# Patient Record
Sex: FEMALE | Race: WHITE | NOT HISPANIC OR LATINO | Employment: FULL TIME | URBAN - METROPOLITAN AREA
[De-identification: names, ages, dates, MRNs, and addresses within clinical notes are randomized per-mention and may not be internally consistent; named-entity substitution may affect disease eponyms.]

---

## 2017-05-30 ENCOUNTER — HOSPITAL ENCOUNTER (EMERGENCY)
Facility: MEDICAL CENTER | Age: 42
End: 2017-05-30
Attending: EMERGENCY MEDICINE

## 2017-05-30 VITALS
DIASTOLIC BLOOD PRESSURE: 102 MMHG | SYSTOLIC BLOOD PRESSURE: 172 MMHG | TEMPERATURE: 98.6 F | WEIGHT: 132.28 LBS | RESPIRATION RATE: 18 BRPM | HEIGHT: 65 IN | OXYGEN SATURATION: 96 % | HEART RATE: 71 BPM | BODY MASS INDEX: 22.04 KG/M2

## 2017-05-30 DIAGNOSIS — F41.9 ANXIETY: ICD-10-CM

## 2017-05-30 DIAGNOSIS — I10 ESSENTIAL HYPERTENSION: ICD-10-CM

## 2017-05-30 LAB
ALBUMIN SERPL BCP-MCNC: 4.3 G/DL (ref 3.2–4.9)
ALBUMIN/GLOB SERPL: 1.4 G/DL
ALP SERPL-CCNC: 45 U/L (ref 30–99)
ALT SERPL-CCNC: 27 U/L (ref 2–50)
ANION GAP SERPL CALC-SCNC: 11 MMOL/L (ref 0–11.9)
AST SERPL-CCNC: 22 U/L (ref 12–45)
BASOPHILS # BLD AUTO: 0.9 % (ref 0–1.8)
BASOPHILS # BLD: 0.04 K/UL (ref 0–0.12)
BILIRUB SERPL-MCNC: 0.5 MG/DL (ref 0.1–1.5)
BUN SERPL-MCNC: 10 MG/DL (ref 8–22)
CALCIUM SERPL-MCNC: 9 MG/DL (ref 8.4–10.2)
CHLORIDE SERPL-SCNC: 108 MMOL/L (ref 96–112)
CO2 SERPL-SCNC: 19 MMOL/L (ref 20–33)
CREAT SERPL-MCNC: 0.75 MG/DL (ref 0.5–1.4)
EKG IMPRESSION: NORMAL
EOSINOPHIL # BLD AUTO: 0.03 K/UL (ref 0–0.51)
EOSINOPHIL NFR BLD: 0.7 % (ref 0–6.9)
ERYTHROCYTE [DISTWIDTH] IN BLOOD BY AUTOMATED COUNT: 43.4 FL (ref 35.9–50)
GFR SERPL CREATININE-BSD FRML MDRD: >60 ML/MIN/1.73 M 2
GLOBULIN SER CALC-MCNC: 3.1 G/DL (ref 1.9–3.5)
GLUCOSE SERPL-MCNC: 90 MG/DL (ref 65–99)
HCT VFR BLD AUTO: 41.1 % (ref 37–47)
HGB BLD-MCNC: 14.6 G/DL (ref 12–16)
IMM GRANULOCYTES # BLD AUTO: 0.01 K/UL (ref 0–0.11)
IMM GRANULOCYTES NFR BLD AUTO: 0.2 % (ref 0–0.9)
LYMPHOCYTES # BLD AUTO: 1.3 K/UL (ref 1–4.8)
LYMPHOCYTES NFR BLD: 28.6 % (ref 22–41)
MCH RBC QN AUTO: 34.4 PG (ref 27–33)
MCHC RBC AUTO-ENTMCNC: 35.5 G/DL (ref 33.6–35)
MCV RBC AUTO: 96.7 FL (ref 81.4–97.8)
MONOCYTES # BLD AUTO: 0.29 K/UL (ref 0–0.85)
MONOCYTES NFR BLD AUTO: 6.4 % (ref 0–13.4)
NEUTROPHILS # BLD AUTO: 2.88 K/UL (ref 2–7.15)
NEUTROPHILS NFR BLD: 63.2 % (ref 44–72)
NRBC # BLD AUTO: 0 K/UL
NRBC BLD AUTO-RTO: 0 /100 WBC
PLATELET # BLD AUTO: 229 K/UL (ref 164–446)
PMV BLD AUTO: 9.1 FL (ref 9–12.9)
POTASSIUM SERPL-SCNC: 3.7 MMOL/L (ref 3.6–5.5)
PROT SERPL-MCNC: 7.4 G/DL (ref 6–8.2)
RBC # BLD AUTO: 4.25 M/UL (ref 4.2–5.4)
SODIUM SERPL-SCNC: 138 MMOL/L (ref 135–145)
TROPONIN I SERPL-MCNC: <0.02 NG/ML (ref 0–0.04)
WBC # BLD AUTO: 4.6 K/UL (ref 4.8–10.8)

## 2017-05-30 PROCEDURE — 93005 ELECTROCARDIOGRAM TRACING: CPT | Performed by: EMERGENCY MEDICINE

## 2017-05-30 PROCEDURE — 99284 EMERGENCY DEPT VISIT MOD MDM: CPT

## 2017-05-30 PROCEDURE — 80053 COMPREHEN METABOLIC PANEL: CPT

## 2017-05-30 PROCEDURE — 36415 COLL VENOUS BLD VENIPUNCTURE: CPT

## 2017-05-30 PROCEDURE — A9270 NON-COVERED ITEM OR SERVICE: HCPCS | Performed by: EMERGENCY MEDICINE

## 2017-05-30 PROCEDURE — 84484 ASSAY OF TROPONIN QUANT: CPT

## 2017-05-30 PROCEDURE — 700102 HCHG RX REV CODE 250 W/ 637 OVERRIDE(OP): Performed by: EMERGENCY MEDICINE

## 2017-05-30 PROCEDURE — 85025 COMPLETE CBC W/AUTO DIFF WBC: CPT

## 2017-05-30 RX ORDER — ALPRAZOLAM 0.5 MG/1
0.5 TABLET ORAL 2 TIMES DAILY
Qty: 24 TAB | Refills: 0 | Status: SHIPPED | OUTPATIENT
Start: 2017-05-30

## 2017-05-30 RX ORDER — LISINOPRIL 5 MG/1
5 TABLET ORAL ONCE
Status: COMPLETED | OUTPATIENT
Start: 2017-05-30 | End: 2017-05-30

## 2017-05-30 RX ORDER — LISINOPRIL 5 MG/1
5 TABLET ORAL DAILY
Qty: 30 TAB | Refills: 0 | Status: SHIPPED | OUTPATIENT
Start: 2017-05-30

## 2017-05-30 RX ORDER — ACETAMINOPHEN 500 MG
1000 TABLET ORAL EVERY 6 HOURS PRN
Status: SHIPPED | COMMUNITY
End: 2021-07-14

## 2017-05-30 RX ORDER — LORAZEPAM 1 MG/1
1 TABLET ORAL ONCE
Status: COMPLETED | OUTPATIENT
Start: 2017-05-30 | End: 2017-05-30

## 2017-05-30 RX ADMIN — LISINOPRIL 5 MG: 5 TABLET ORAL at 10:20

## 2017-05-30 RX ADMIN — LORAZEPAM 1 MG: 1 TABLET ORAL at 10:21

## 2017-05-30 ASSESSMENT — ENCOUNTER SYMPTOMS
HEADACHES: 0
NAUSEA: 0
SHORTNESS OF BREATH: 0
BACK PAIN: 0
NERVOUS/ANXIOUS: 1
FEVER: 0
ABDOMINAL PAIN: 0
DIZZINESS: 0
WEAKNESS: 0

## 2017-05-30 ASSESSMENT — PAIN SCALES - GENERAL: PAINLEVEL_OUTOF10: 0

## 2017-05-30 NOTE — ED NOTES
The Medication Reconciliation process has been completed by interviewing the patient    Allergies have been reviewed  Antibiotic use in 30 days - NONE    Home Pharmacy:  [facility] [address]

## 2017-05-30 NOTE — ED NOTES
"Pt amb to triage c/o high BP this morning which was checked at [facility] this morning 186/120. Pt also c/o slight HA. Pt took lisinopril in the past. Last taken was a year ago. Pt stopped taking meds due to financial difficulties.   /122 mmHg  Pulse 89  Temp(Src) 37 °C (98.6 °F)  Resp 20  Ht 1.651 m (5' 5\")  Wt 60 kg (132 lb 4.4 oz)  BMI 22.01 kg/m2  SpO2 97%    "

## 2017-05-30 NOTE — ED AVS SNAPSHOT
Home Care Instructions                                                                                                                Mary Ann Gilbert   MRN: [id number]    Department:  [facility], Emergency Dept   Date of Visit:  5/30/2017            [facility], Emergency Dept    [address]    [place] [address]    Phone:  [phone]      You were seen by     Bridgette Torres D.O.      Your Diagnosis Was     Essential hypertension     I10       These are the medications you received during your hospitalization from 05/30/2017 0847 to 05/30/2017 1045     Date/Time Order Dose Route Action    05/30/2017 1021 lorazepam (ATIVAN) tablet 1 mg 1 mg Oral Given    05/30/2017 1020 lisinopril (PRINIVIL) tablet 5 mg 5 mg Oral Given      Follow-up Information     1. Schedule an appointment as soon as possible for a visit with [facility].    Contact information    [address] [address]  [phone]        2. Schedule an appointment as soon as possible for a visit with [facility].    Specialty:  Family Medicine    Contact information    [address]  [address]  [place] [address]  [phone]          3. Schedule an appointment as soon as possible for a visit with Silvia Pate D.O..    Specialty:  Family Medicine    Contact information    [address]  [address]  [facility] [address]  [phone]          4. Schedule an appointment as soon as possible for a visit with [facility].    Contact information    [address]  [place] [address]  [phone]          5. Follow up with [facility], Emergency Dept.    Specialty:  Emergency Medicine    Why:  If symptoms worsen    Contact information    [address]  [facility] [address] [phone]      Medication Information     Review all of your home medications and newly ordered medications with your primary doctor and/or pharmacist as soon as possible.  Follow medication instructions as directed by your doctor and/or pharmacist.     Please keep your complete medication list with you and share with your physician. Update the information when medications are discontinued, doses are changed, or new medications (including over-the-counter products) are added; and carry medication information at all times in the event of emergency situations.               Medication List      START taking these medications        Instructions    Morning Afternoon Evening Bedtime    alprazolam 0.5 MG Tabs   Commonly known as:  XANAX        Take 1 Tab by mouth 2 Times a Day.   Dose:  0.5 mg                        lisinopril 5 MG Tabs   Last time this was given:  5 mg on 5/30/2017 10:20 AM   Commonly known as:  PRINIVIL        Take 1 Tab by mouth every day.   Dose:  5 mg                          ASK your doctor about these medications        Instructions    Morning Afternoon Evening Bedtime    acetaminophen 500 MG Tabs   Commonly known as:  TYLENOL        Take 1,000 mg by mouth every 6 hours as needed.   Dose:  1000 mg                        MELATONIN PO        Take 1 Dose by mouth at bedtime as needed.   Dose:  1 Dose                        VALERIAN PO        Take 1 Dose by mouth every day.   Dose:  1 Dose                             Where to Get Your Medications      You can get these medications from any pharmacy     Bring a paper prescription for each of these medications    - alprazolam 0.5 MG Tabs  - lisinopril 5 MG Tabs            Procedures and tests performed during your visit     CBC WITH DIFFERENTIAL    CMP    EKG (ER)    ESTIMATED GFR    TROPONIN        Discharge Instructions       Arterial Hypertension  Arterial hypertension (high blood pressure) is a condition of elevated pressure in your blood vessels. Hypertension over a long period of time is a risk factor for strokes, heart attacks, and heart failure. It is also the leading cause of kidney (renal) failure.   CAUSES  "  · In Adults -- Over 90% of all hypertension has no known cause. This is called essential or primary hypertension. In the other 10% of people with hypertension, the increase in blood pressure is caused by another disorder. This is called secondary hypertension. Important causes of secondary hypertension are:  · Heavy alcohol use.  · Obstructive sleep apnea.  · Hyperaldosterosim (Conn's syndrome).  · Steroid use.  · Chronic kidney failure.  · Hyperparathyroidism.  · Medications.  · Renal artery stenosis.  · Pheochromocytoma.  · Cushing's disease.  · Coarctation of the aorta.  · Scleroderma renal crisis.  · Licorice (in excessive amounts).  · Drugs (cocaine, methamphetamine).  Your caregiver can explain any items above that apply to you.  · In Children -- Secondary hypertension is more common and should always be considered.  · Pregnancy -- Few women of childbearing age have high blood pressure. However, up to 10% of them develop hypertension of pregnancy. Generally, this will not harm the woman. It may be a sign of 3 complications of pregnancy: preeclampsia, HELLP syndrome, and eclampsia. Follow up and control with medication is necessary.  SYMPTOMS   · This condition normally does not produce any noticeable symptoms. It is usually found during a routine exam.  · Malignant hypertension is a late problem of high blood pressure. It may have the following symptoms:  · Headaches.  · Blurred vision.  · End-organ damage (this means your kidneys, heart, lungs, and other organs are being damaged).  · Stressful situations can increase the blood pressure. If a person with normal blood pressure has their blood pressure go up while being seen by their caregiver, this is often termed \"white coat hypertension.\" Its importance is not known. It may be related with eventually developing hypertension or complications of hypertension.  · Hypertension is often confused with mental tension, stress, and anxiety.  DIAGNOSIS   The " "diagnosis is made by 3 separate blood pressure measurements. They are taken at least 1 week apart from each other. If there is organ damage from hypertension, the diagnosis may be made without repeat measurements.  Hypertension is usually identified by having blood pressure readings:  · Above 140/90 mmHg measured in both arms, at 3 separate times, over a couple weeks.  · Over 130/80 mmHg should be considered a risk factor and may require treatment in patients with diabetes.  Blood pressure readings over 120/80 mmHg are called \"pre-hypertension\" even in non-diabetic patients.  To get a true blood pressure measurement, use the following guidelines. Be aware of the factors that can alter blood pressure readings.  · Take measurements at least 1 hour after caffeine.  · Take measurements 30 minutes after smoking and without any stress. This is another reason to quit smoking  it raises your blood pressure.  · Use a proper cuff size. Ask your caregiver if you are not sure about your cuff size.  · Most home blood pressure cuffs are automatic. They will measure systolic and diastolic pressures. The systolic pressure is the pressure reading at the start of sounds. Diastolic pressure is the pressure at which the sounds disappear. If you are elderly, measure pressures in multiple postures. Try sitting, lying or standing.  · Sit at rest for a minimum of 5 minutes before taking measurements.  · You should not be on any medications like decongestants. These are found in many cold medications.  · Record your blood pressure readings and review them with your caregiver.  If you have hypertension:  · Your caregiver may do tests to be sure you do not have secondary hypertension (see \"causes\" above).  · Your caregiver may also look for signs of metabolic syndrome. This is also called Syndrome X or Insulin Resistance Syndrome. You may have this syndrome if you have type 2 diabetes, abdominal obesity, and abnormal blood lipids in addition " to hypertension.  · Your caregiver will take your medical and family history and perform a physical exam.  · Diagnostic tests may include blood tests (for glucose, cholesterol, potassium, and kidney function), a urinalysis, or an EKG. Other tests may also be necessary depending on your condition.  PREVENTION   There are important lifestyle issues that you can adopt to reduce your chance of developing hypertension:  · Maintain a normal weight.  · Limit the amount of salt (sodium) in your diet.  · Exercise often.  · Limit alcohol intake.  · Get enough potassium in your diet. Discuss specific advice with your caregiver.  · Follow a DASH diet (dietary approaches to stop hypertension). This diet is rich in fruits, vegetables, and low-fat dairy products, and avoids certain fats.  PROGNOSIS   Essential hypertension cannot be cured. Lifestyle changes and medical treatment can lower blood pressure and reduce complications. The prognosis of secondary hypertension depends on the underlying cause. Many people whose hypertension is controlled with medicine or lifestyle changes can live a normal, healthy life.   RISKS AND COMPLICATIONS   While high blood pressure alone is not an illness, it often requires treatment due to its short- and long-term effects on many organs. Hypertension increases your risk for:  · CVAs or strokes (cerebrovascular accident).  · Heart failure due to chronically high blood pressure (hypertensive cardiomyopathy).  · Heart attack (myocardial infarction).  · Damage to the retina (hypertensive retinopathy).  · Kidney failure (hypertensive nephropathy).  Your caregiver can explain list items above that apply to you. Treatment of hypertension can significantly reduce the risk of complications.  TREATMENT   · For overweight patients, weight loss and regular exercise are recommended. Physical fitness lowers blood pressure.  · Mild hypertension is usually treated with diet and exercise. A diet rich in fruits and  "vegetables, fat-free dairy products, and foods low in fat and salt (sodium) can help lower blood pressure. Decreasing salt intake decreases blood pressure in a 1/3 of people.  · Stop smoking if you are a smoker.  The steps above are highly effective in reducing blood pressure. While these actions are easy to suggest, they are difficult to achieve. Most patients with moderate or severe hypertension end up requiring medications to bring their blood pressure down to a normal level. There are several classes of medications for treatment. Blood pressure pills (antihypertensives) will lower blood pressure by their different actions. Lowering the blood pressure by 10 mmHg may decrease the risk of complications by as much as 25%.  The goal of treatment is effective blood pressure control. This will reduce your risk for complications. Your caregiver will help you determine the best treatment for you according to your lifestyle. What is excellent treatment for one person, may not be for you.  HOME CARE INSTRUCTIONS   · Do not smoke.  · Follow the lifestyle changes outlined in the \"Prevention\" section.  · If you are on medications, follow the directions carefully. Blood pressure medications must be taken as prescribed. Skipping doses reduces their benefit. It also puts you at risk for problems.  · Follow up with your caregiver, as directed.  · If you are asked to monitor your blood pressure at home, follow the guidelines in the \"Diagnosis\" section above.  SEEK MEDICAL CARE IF:   · You think you are having medication side effects.  · You have recurrent headaches or lightheadedness.  · You have swelling in your ankles.  · You have trouble with your vision.  SEEK IMMEDIATE MEDICAL CARE IF:   · You have sudden onset of chest pain or pressure, difficulty breathing, or other symptoms of a heart attack.  · You have a severe headache.  · You have symptoms of a stroke (such as sudden weakness, difficulty speaking, difficulty " walking).  MAKE SURE YOU:   · Understand these instructions.  · Will watch your condition.  · Will get help right away if you are not doing well or get worse.  Document Released: 12/18/2006 Document Revised: 03/11/2013 Document Reviewed: 07/17/2008  ExitCare® Patient Information ©2014 [employer].      Generalized Anxiety Disorder  Generalized anxiety disorder (DIONY) is a mental disorder. It interferes with life functions, including relationships, work, and school.  DIONY is different from normal anxiety, which everyone experiences at some point in their lives in response to specific life events and activities. Normal anxiety actually helps us prepare for and get through these life events and activities. Normal anxiety goes away after the event or activity is over.   DIONY causes anxiety that is not necessarily related to specific events or activities. It also causes excess anxiety in proportion to specific events or activities. The anxiety associated with DIONY is also difficult to control. DIONY can vary from mild to severe. People with severe DIONY can have intense waves of anxiety with physical symptoms (panic attacks).   SYMPTOMS  The anxiety and worry associated with DIONY are difficult to control. This anxiety and worry are related to many life events and activities and also occur more days than not for 6 months or longer. People with DIONY also have three or more of the following symptoms (one or more in children):  · Restlessness.    · Fatigue.  · Difficulty concentrating.    · Irritability.  · Muscle tension.  · Difficulty sleeping or unsatisfying sleep.  DIAGNOSIS  DIONY is diagnosed through an assessment by your health care provider. Your health care provider will ask you questions about your mood, physical symptoms, and events in your life. Your health care provider may ask you about your medical history and use of alcohol or drugs, including prescription medicines. Your health care provider may also do a physical  exam and blood tests. Certain medical conditions and the use of certain substances can cause symptoms similar to those associated with DIONY. Your health care provider may refer you to a mental health specialist for further evaluation.  TREATMENT  The following therapies are usually used to treat DIONY:   · Medication. Antidepressant medication usually is prescribed for long-term daily control. Antianxiety medicines may be added in severe cases, especially when panic attacks occur.    · Talk therapy (psychotherapy). Certain types of talk therapy can be helpful in treating DIONY by providing support, education, and guidance. A form of talk therapy called cognitive behavioral therapy can teach you healthy ways to think about and react to daily life events and activities.  · Stress management techniques. These include yoga, meditation, and exercise and can be very helpful when they are practiced regularly.  A mental health specialist can help determine which treatment is best for you. Some people see improvement with one therapy. However, other people require a combination of therapies.     This information is not intended to replace advice given to you by your health care provider. Make sure you discuss any questions you have with your health care provider.     Document Released: 04/14/2014 Document Revised: 01/08/2016 Document Reviewed: 04/14/2014  [employer] Interactive Patient Education ©2016 [employer]            Patient Information     Patient Information    Following emergency treatment: all patient requiring follow-up care must return either to a private physician or a clinic if your condition worsens before you are able to obtain further medical attention, please return to the emergency room.     Billing Information    At [facility], we work to make the billing process streamlined for our patients.  Our Representatives are here to answer any questions you may have regarding your hospital bill.  If you have  insurance coverage and have supplied your insurance information to us, we will submit a claim to your insurer on your behalf.  Should you have any questions regarding your bill, we can be reached online or by phone as follows:  Online: You are able pay your bills online or live chat with our representatives about any billing questions you may have. We are here to help Monday - Friday from 8:00am to 7:30pm and 9:00am - 12:00pm on Saturdays.  Please visit [email or web address]  for more information.   Phone:  [phone] or [phone]    Please note that your emergency physician, surgeon, pathologist, radiologist, anesthesiologist, and other specialists are not employed by [facility] and will therefore bill separately for their services.  Please contact them directly for any questions concerning their bills at the numbers below:     Emergency Physician Services:  [phone]  [facility]:  [phone]  [facility]:  [phone]  [facility]:  [phone]    1. Your final bill may vary from the amount quoted upon discharge if all procedures are not complete at that time, or if your doctor has additional procedures of which we are not aware. You will receive an additional bill if you return to the Emergency Department at [facility] for suture removal regardless of the facility of which the sutures were placed.     2. Please arrange for settlement of this account at the emergency registration.    3. All self-pay accounts are due in full at the time of treatment.  If you are unable to meet this obligation then payment is expected within 4-5 days.     4. If you have had radiology studies (CT, X-ray, Ultrasound, MRI), you have received a preliminary result during your emergency department visit. Please contact the radiology department [phone] to receive a copy of your final result. Please discuss the Final result with your  primary physician or with the follow up physician provided.     Crisis Hotline:  [place] Crisis Hotline:  2-825-EWXPLEJ or 1-254.640.3134  [place] Crisis Hotline:    1-897.613.8610 or 102-423-7301         ED Discharge Follow Up Questions    1. In order to provide you with very good care, we would like to follow up with a phone call in the next few days.  May we have your permission to contact you?     YES /  NO    2. What is the best phone number to call you? (       )_____-__________    3. What is the best time to call you?      Morning  /  Afternoon  /  Evening                   Patient Signature:  ____________________________________________________________    Date:  ____________________________________________________________

## 2017-05-30 NOTE — ED PROVIDER NOTES
"ED Provider Note    ED Provider Note    Scribed for Bridgette Torres D.O. by Bridgette Torres. 5/30/2017, 9:31 AM.    Primary care provider: Pcp Unknown  Means of arrival: POV  History obtained from: Patient  History limited by: None    CHIEF COMPLAINT  Chief Complaint   Patient presents with   • Hypertension       HPI  Mary Ann Gilbert is a 42 y.o. female who presents to the Emergency Department for the chief complaint of elevated blood pressure and anxiety. He also has intermittent lightheadedness and chest pain. Patient states she's had these symptoms on and off for \"a while\". She does have a history of hypertension and was previously treated with lisinopril, she thinks 5 or 10 mg dose. However, due to finances she has been off this medication for at least one year. She has a history of seizures. No history of diabetes or thyroid disorder. She is not having chest pain at this time. She also reports feeling strange. She points to being very anxious. She took her blood pressure at [facility] this morning noted it was high which prompted her to come to the emergency department. She is a prior surgical history. She does look cigarettes approximately 4 per day and she drinks alcohol daily, 2-3 drinks per night. She denies any drug use. She does have a family history of hypertension with her mother. Her father, who accompanies her here in the ED has had a stroke when he was the age of 54, he is now 74. Patient also reports that she has taken alprazolam in the past for anxiety which has been helpful.    REVIEW OF SYSTEMS  Review of Systems   Constitutional: Negative for fever.   Respiratory: Negative for shortness of breath.    Cardiovascular: Negative for chest pain.   Gastrointestinal: Negative for nausea and abdominal pain.   Genitourinary: Negative for dysuria.   Musculoskeletal: Negative for back pain.   Neurological: Negative for dizziness, weakness and headaches.   Psychiatric/Behavioral: The patient is " "nervous/anxious.        PAST MEDICAL HISTORY       SURGICAL HISTORY  patient denies any surgical history    SOCIAL HISTORY  Social History   Substance Use Topics   • Smoking status: Not on file   • Smokeless tobacco: Not on file      Comment: nonsmoker   • Alcohol Use: Not on file      History   Drug Use Not on file       FAMILY HISTORY  No family history on file.    CURRENT MEDICATIONS  Home Medications     Reviewed by Edy Mendoza (Pharmacy Tech) on 05/30/17 at 1026  Med List Status: Complete    Medication Last Dose Status    acetaminophen (TYLENOL) 500 MG Tab 5/29/2017 Active    MELATONIN PO 5/30/2017 Active    VALERIAN PO 5/29/2017 Active                ALLERGIES  Allergies   Allergen Reactions   • Pcn [Penicillins] Anaphylaxis     Rxn - as a baby and a strong familial allergy       PHYSICAL EXAM  VITAL SIGNS: /102 mmHg  Pulse 71  Temp(Src) 37 °C (98.6 °F)  Resp 18  Ht 1.651 m (5' 5\")  Wt 60 kg (132 lb 4.4 oz)  BMI 22.01 kg/m2  SpO2 96%  Vitals reviewed.  Constitutional: Patient is oriented to person, place, and time. Appears well-developed and well-nourished. No distress.  very anxious   Head: Normocephalic and atraumatic.   Ears: Normal external ears bilaterally.   Mouth/Throat: Oropharynx is clear and moist  Eyes: Conjunctivae are normal. Pupils are equal, round, and reactive to light.   Neck: Normal range of motion. Neck supple.  Cardiovascular: Normal rate, regular rhythm and normal heart sounds.   Pulmonary/Chest: Effort normal and breath sounds normal. No respiratory distress, no wheezes, rhonchi, or rales.   Abdominal: Soft. Bowel sounds are normal. There is no tenderness, rebound or guarding, or peritoneal signs. No CVA tenderness.  Musculoskeletal: No edema and no tenderness.   Lymphadenopathy: No cervical adenopathy.   Neurological: No focal deficits.   Skin: Skin is warm and dry. No erythema. No pallor.   Psychiatric: Patient has a normal mood and affect, given circumstances, " she is very anxious but forthcoming and pleasant     LABS  Results for orders placed or performed during the hospital encounter of 05/30/17   TROPONIN   Result Value Ref Range    Troponin I <0.02 0.00 - 0.04 ng/mL   CMP   Result Value Ref Range    Sodium 138 135 - 145 mmol/L    Potassium 3.7 3.6 - 5.5 mmol/L    Chloride 108 96 - 112 mmol/L    Co2 19 (L) 20 - 33 mmol/L    Anion Gap 11.0 0.0 - 11.9    Glucose 90 65 - 99 mg/dL    Bun 10 8 - 22 mg/dL    Creatinine 0.75 0.50 - 1.40 mg/dL    Calcium 9.0 8.4 - 10.2 mg/dL    AST(SGOT) 22 12 - 45 U/L    ALT(SGPT) 27 2 - 50 U/L    Alkaline Phosphatase 45 30 - 99 U/L    Total Bilirubin 0.5 0.1 - 1.5 mg/dL    Albumin 4.3 3.2 - 4.9 g/dL    Total Protein 7.4 6.0 - 8.2 g/dL    Globulin 3.1 1.9 - 3.5 g/dL    A-G Ratio 1.4 g/dL   CBC WITH DIFFERENTIAL   Result Value Ref Range    WBC 4.6 (L) 4.8 - 10.8 K/uL    RBC 4.25 4.20 - 5.40 M/uL    Hemoglobin 14.6 12.0 - 16.0 g/dL    Hematocrit 41.1 37.0 - 47.0 %    MCV 96.7 81.4 - 97.8 fL    MCH 34.4 (H) 27.0 - 33.0 pg    MCHC 35.5 (H) 33.6 - 35.0 g/dL    RDW 43.4 35.9 - 50.0 fL    Platelet Count 229 164 - 446 K/uL    MPV 9.1 9.0 - 12.9 fL    Neutrophils-Polys 63.20 44.00 - 72.00 %    Lymphocytes 28.60 22.00 - 41.00 %    Monocytes 6.40 0.00 - 13.40 %    Eosinophils 0.70 0.00 - 6.90 %    Basophils 0.90 0.00 - 1.80 %    Immature Granulocytes 0.20 0.00 - 0.90 %    Nucleated RBC 0.00 /100 WBC    Neutrophils (Absolute) 2.88 2.00 - 7.15 K/uL    Lymphs (Absolute) 1.30 1.00 - 4.80 K/uL    Monos (Absolute) 0.29 0.00 - 0.85 K/uL    Eos (Absolute) 0.03 0.00 - 0.51 K/uL    Baso (Absolute) 0.04 0.00 - 0.12 K/uL    Immature Granulocytes (abs) 0.01 0.00 - 0.11 K/uL    NRBC (Absolute) 0.00 K/uL   ESTIMATED GFR   Result Value Ref Range    GFR If African American >60 >60 mL/min/1.73 m 2    GFR If Non African American >60 >60 mL/min/1.73 m 2   EKG (ER)   Result Value Ref Range    Report       [facility] Emergency Dept.    Test Date:   2017  Pt Name:    MARTI FLANNERY                Department: [facility]  MRN:        [id number]                      Room:       -ROOM 1  Gender:     F                            Technician: GLORIA  :        1975                   Requested By:ER TRIAGE PROTOCOL  Order #:    [id number]                    Reading MD:    Measurements  Intervals                                Axis  Rate:       89                           P:          69  NC:         146                          QRS:        52  QRSD:       88                           T:          15  QT:         370  QTc:        451    Interpretive Statements  Sinus rhythm  Probable left atrial enlargement  No previous ECG available for comparison         All labs reviewed by me.    EKG Interpretation  Interpreted by me    Rhythm: normal sinus   Rate: 89  Axis: normal  Ectopy: none  Conduction: normal  ST Segments: no acute change  T Waves: no acute change  Q Waves: none    Clinical Impression: No acute ST segment elevation. There is no old EKG for comparison. Sinus rhythm rate of 89.     COURSE & MEDICAL DECISION MAKING  Pertinent Labs & Imaging studies reviewed. (See chart for details)    Obtained and reviewed past medical records. Patient's last ED visit with  where she was seen as an outpatient for viral syndrome.    9:31 AM - Patient seen and examined at bedside. Patient is well-appearing and nontoxic. She does not have any symptoms now to suggest end organ damage. No chest pain. No neurologic deficits. No headache. No visual changes. We will screen for end organ damage. She's had an EKG which is reassuring. If no abnormalities noted, I have advised, that we simply restart her hypertensive meds and I'm encouraged her to establish care with a primary care provider.     10:38 AM he is reevaluated. We discussed labs which were normal creatinine. Normal troponin. She is not expressing any pain at this time. She is feeling better. Still hypertensive. Review of her   shows that she has been taking alprazolam, 0.25 mg in the past and she reports improvement in her anxiety symptoms with this. We'll restart her on lisinopril 5 mg once a day. She is advised to establish care with a primary care provider. She is given multiple options. She is given strict return precautions.    She'll be discharged home in stable condition.    FINAL IMPRESSION  1. Essential hypertension    2. Anxiety

## 2017-05-30 NOTE — ED AVS SNAPSHOT
Lentigen Access Code: [email or web address]  Expires: 6/29/2017 10:45 AM    Your email address is not on file at [facility].  Email Addresses are required for you to sign up for Lentigen, please contact [phone] to verify your personal information and to provide your email address prior to attempting to register for Lentigen.    Mary Annmarcus Swannpernell Gilbert  [address] [place] [id number]    Lentigen  A secure, online tool to manage your health information     [facility]’s Lentigen® is a secure, online tool that connects you to your personalized health information from the privacy of your home -- day or night - making it very easy for you to manage your healthcare. Once the activation process is completed, you can even access your medical information using the Lentigen gladis, which is available for free in the Apple Gladis store or Google Play store.     To learn more about Lentigen, visit [email or web address]    There are two levels of access available (as shown below):   My Chart Features  [facility] Primary Care Doctor [facility]  Specialists [facility]  Urgent  Care [facility] Primary Care Doctor   Email your healthcare team securely and privately 24/7 X X X    Manage appointments: schedule your next appointment; view details of past/upcoming appointments X      Request prescription refills. X      View recent personal medical records, including lab and immunizations X X X X   View health record, including health history, allergies, medications X X X X   Read reports about your outpatient visits, procedures, consult and ER notes X X X X   See your discharge summary, which is a recap of your hospital and/or ER visit that includes your diagnosis, lab results, and care plan X X  X     How to register for Connect Technology Groupt:  Once your e-mail address has been verified, follow the following steps to sign up for Connect Technology Groupt.     1. Go to  [email or web address]  2. Click on the Sign Up Now box, which takes you to the New Member Sign Up page. You will  need to provide the following information:  a. Enter your Handprint Access Code exactly as it appears at the top of this page. (You will not need to use this code after you’ve completed the sign-up process. If you do not sign up before the expiration date, you must request a new code.)   b. Enter your date of birth.   c. Enter your home email address.   d. Click Submit, and follow the next screen’s instructions.  3. Create a Openbuildst ID. This will be your Handprint login ID and cannot be changed, so think of one that is secure and easy to remember.  4. Create a Handprint password. You can change your password at any time.  5. Enter your Password Reset Question and Answer. This can be used at a later time if you forget your password.   6. Enter your e-mail address. This allows you to receive e-mail notifications when new information is available in Handprint.  7. Click Sign Up. You can now view your health information.    For assistance activating your Handprint account, call [phone]

## 2017-05-30 NOTE — ED NOTES
"Verbalized that's she is under a lot of stress. Getting a divorce, moving. \"I feel really weird\". Some chest pain yesterday. EKG ordered.  "

## 2017-05-30 NOTE — DISCHARGE INSTRUCTIONS
Arterial Hypertension  Arterial hypertension (high blood pressure) is a condition of elevated pressure in your blood vessels. Hypertension over a long period of time is a risk factor for strokes, heart attacks, and heart failure. It is also the leading cause of kidney (renal) failure.   CAUSES   · In Adults -- Over 90% of all hypertension has no known cause. This is called essential or primary hypertension. In the other 10% of people with hypertension, the increase in blood pressure is caused by another disorder. This is called secondary hypertension. Important causes of secondary hypertension are:  · Heavy alcohol use.  · Obstructive sleep apnea.  · Hyperaldosterosim (Conn's syndrome).  · Steroid use.  · Chronic kidney failure.  · Hyperparathyroidism.  · Medications.  · Renal artery stenosis.  · Pheochromocytoma.  · Cushing's disease.  · Coarctation of the aorta.  · Scleroderma renal crisis.  · Licorice (in excessive amounts).  · Drugs (cocaine, methamphetamine).  Your caregiver can explain any items above that apply to you.  · In Children -- Secondary hypertension is more common and should always be considered.  · Pregnancy -- Few women of childbearing age have high blood pressure. However, up to 10% of them develop hypertension of pregnancy. Generally, this will not harm the woman. It may be a sign of 3 complications of pregnancy: preeclampsia, HELLP syndrome, and eclampsia. Follow up and control with medication is necessary.  SYMPTOMS   · This condition normally does not produce any noticeable symptoms. It is usually found during a routine exam.  · Malignant hypertension is a late problem of high blood pressure. It may have the following symptoms:  · Headaches.  · Blurred vision.  · End-organ damage (this means your kidneys, heart, lungs, and other organs are being damaged).  · Stressful situations can increase the blood pressure. If a person with normal blood pressure has their blood pressure go up while being  "seen by their caregiver, this is often termed \"white coat hypertension.\" Its importance is not known. It may be related with eventually developing hypertension or complications of hypertension.  · Hypertension is often confused with mental tension, stress, and anxiety.  DIAGNOSIS   The diagnosis is made by 3 separate blood pressure measurements. They are taken at least 1 week apart from each other. If there is organ damage from hypertension, the diagnosis may be made without repeat measurements.  Hypertension is usually identified by having blood pressure readings:  · Above 140/90 mmHg measured in both arms, at 3 separate times, over a couple weeks.  · Over 130/80 mmHg should be considered a risk factor and may require treatment in patients with diabetes.  Blood pressure readings over 120/80 mmHg are called \"pre-hypertension\" even in non-diabetic patients.  To get a true blood pressure measurement, use the following guidelines. Be aware of the factors that can alter blood pressure readings.  · Take measurements at least 1 hour after caffeine.  · Take measurements 30 minutes after smoking and without any stress. This is another reason to quit smoking  it raises your blood pressure.  · Use a proper cuff size. Ask your caregiver if you are not sure about your cuff size.  · Most home blood pressure cuffs are automatic. They will measure systolic and diastolic pressures. The systolic pressure is the pressure reading at the start of sounds. Diastolic pressure is the pressure at which the sounds disappear. If you are elderly, measure pressures in multiple postures. Try sitting, lying or standing.  · Sit at rest for a minimum of 5 minutes before taking measurements.  · You should not be on any medications like decongestants. These are found in many cold medications.  · Record your blood pressure readings and review them with your caregiver.  If you have hypertension:  · Your caregiver may do tests to be sure you do not have " "secondary hypertension (see \"causes\" above).  · Your caregiver may also look for signs of metabolic syndrome. This is also called Syndrome X or Insulin Resistance Syndrome. You may have this syndrome if you have type 2 diabetes, abdominal obesity, and abnormal blood lipids in addition to hypertension.  · Your caregiver will take your medical and family history and perform a physical exam.  · Diagnostic tests may include blood tests (for glucose, cholesterol, potassium, and kidney function), a urinalysis, or an EKG. Other tests may also be necessary depending on your condition.  PREVENTION   There are important lifestyle issues that you can adopt to reduce your chance of developing hypertension:  · Maintain a normal weight.  · Limit the amount of salt (sodium) in your diet.  · Exercise often.  · Limit alcohol intake.  · Get enough potassium in your diet. Discuss specific advice with your caregiver.  · Follow a DASH diet (dietary approaches to stop hypertension). This diet is rich in fruits, vegetables, and low-fat dairy products, and avoids certain fats.  PROGNOSIS   Essential hypertension cannot be cured. Lifestyle changes and medical treatment can lower blood pressure and reduce complications. The prognosis of secondary hypertension depends on the underlying cause. Many people whose hypertension is controlled with medicine or lifestyle changes can live a normal, healthy life.   RISKS AND COMPLICATIONS   While high blood pressure alone is not an illness, it often requires treatment due to its short- and long-term effects on many organs. Hypertension increases your risk for:  · CVAs or strokes (cerebrovascular accident).  · Heart failure due to chronically high blood pressure (hypertensive cardiomyopathy).  · Heart attack (myocardial infarction).  · Damage to the retina (hypertensive retinopathy).  · Kidney failure (hypertensive nephropathy).  Your caregiver can explain list items above that apply to you. Treatment " "of hypertension can significantly reduce the risk of complications.  TREATMENT   · For overweight patients, weight loss and regular exercise are recommended. Physical fitness lowers blood pressure.  · Mild hypertension is usually treated with diet and exercise. A diet rich in fruits and vegetables, fat-free dairy products, and foods low in fat and salt (sodium) can help lower blood pressure. Decreasing salt intake decreases blood pressure in a 1/3 of people.  · Stop smoking if you are a smoker.  The steps above are highly effective in reducing blood pressure. While these actions are easy to suggest, they are difficult to achieve. Most patients with moderate or severe hypertension end up requiring medications to bring their blood pressure down to a normal level. There are several classes of medications for treatment. Blood pressure pills (antihypertensives) will lower blood pressure by their different actions. Lowering the blood pressure by 10 mmHg may decrease the risk of complications by as much as 25%.  The goal of treatment is effective blood pressure control. This will reduce your risk for complications. Your caregiver will help you determine the best treatment for you according to your lifestyle. What is excellent treatment for one person, may not be for you.  HOME CARE INSTRUCTIONS   · Do not smoke.  · Follow the lifestyle changes outlined in the \"Prevention\" section.  · If you are on medications, follow the directions carefully. Blood pressure medications must be taken as prescribed. Skipping doses reduces their benefit. It also puts you at risk for problems.  · Follow up with your caregiver, as directed.  · If you are asked to monitor your blood pressure at home, follow the guidelines in the \"Diagnosis\" section above.  SEEK MEDICAL CARE IF:   · You think you are having medication side effects.  · You have recurrent headaches or lightheadedness.  · You have swelling in your ankles.  · You have trouble with " your vision.  SEEK IMMEDIATE MEDICAL CARE IF:   · You have sudden onset of chest pain or pressure, difficulty breathing, or other symptoms of a heart attack.  · You have a severe headache.  · You have symptoms of a stroke (such as sudden weakness, difficulty speaking, difficulty walking).  MAKE SURE YOU:   · Understand these instructions.  · Will watch your condition.  · Will get help right away if you are not doing well or get worse.  Document Released: 12/18/2006 Document Revised: 03/11/2013 Document Reviewed: 07/17/2008  ExitCare® Patient Information ©2014 [employer].      Generalized Anxiety Disorder  Generalized anxiety disorder (DIONY) is a mental disorder. It interferes with life functions, including relationships, work, and school.  DIONY is different from normal anxiety, which everyone experiences at some point in their lives in response to specific life events and activities. Normal anxiety actually helps us prepare for and get through these life events and activities. Normal anxiety goes away after the event or activity is over.   DIONY causes anxiety that is not necessarily related to specific events or activities. It also causes excess anxiety in proportion to specific events or activities. The anxiety associated with DIONY is also difficult to control. DIONY can vary from mild to severe. People with severe DIONY can have intense waves of anxiety with physical symptoms (panic attacks).   SYMPTOMS  The anxiety and worry associated with DIONY are difficult to control. This anxiety and worry are related to many life events and activities and also occur more days than not for 6 months or longer. People with DIONY also have three or more of the following symptoms (one or more in children):  · Restlessness.    · Fatigue.  · Difficulty concentrating.    · Irritability.  · Muscle tension.  · Difficulty sleeping or unsatisfying sleep.  DIAGNOSIS  DIONY is diagnosed through an assessment by your health care provider. Your  health care provider will ask you questions about your mood, physical symptoms, and events in your life. Your health care provider may ask you about your medical history and use of alcohol or drugs, including prescription medicines. Your health care provider may also do a physical exam and blood tests. Certain medical conditions and the use of certain substances can cause symptoms similar to those associated with DIONY. Your health care provider may refer you to a mental health specialist for further evaluation.  TREATMENT  The following therapies are usually used to treat DIONY:   · Medication. Antidepressant medication usually is prescribed for long-term daily control. Antianxiety medicines may be added in severe cases, especially when panic attacks occur.    · Talk therapy (psychotherapy). Certain types of talk therapy can be helpful in treating DIONY by providing support, education, and guidance. A form of talk therapy called cognitive behavioral therapy can teach you healthy ways to think about and react to daily life events and activities.  · Stress management techniques. These include yoga, meditation, and exercise and can be very helpful when they are practiced regularly.  A mental health specialist can help determine which treatment is best for you. Some people see improvement with one therapy. However, other people require a combination of therapies.     This information is not intended to replace advice given to you by your health care provider. Make sure you discuss any questions you have with your health care provider.     Document Released: 04/14/2014 Document Revised: 01/08/2016 Document Reviewed: 04/14/2014  [employer] Interactive Patient Education ©2016 [employer]

## 2017-05-30 NOTE — ED AVS SNAPSHOT
5/30/2017    Mary Ann Gilbert  [address] [facility] [address]    Dear Mary Ann:    [facility] wants to ensure your discharge home is safe and you or your loved ones have had all of your questions answered regarding your care after you leave the hospital.    Below is a list of resources and contact information should you have any questions regarding your hospital stay, follow-up instructions, or active medical symptoms.    Questions or Concerns Regarding… Contact   Medical Questions Related to Your Discharge  (7 days a week, 8am-5pm) Contact a Nurse Care Coordinator   [phone]   Medical Questions Not Related to Your Discharge  (24 hours a day / 7 days a week)  Contact the Nurse Health Line   [phone]    Medications or Discharge Instructions Refer to your discharge packet   or contact your [facility] Primary Care Provider   [phone]   Follow-up Appointment(s) Schedule your appointment via [employer]   or contact Scheduling [phone]   Billing Review your statement via [employer]  or contact Billing [phone]   Medical Records Review your records via [employer]   or contact Medical Records [phone]     You may receive a telephone call within two days of discharge. This call is to make certain you understand your discharge instructions and have the opportunity to have any questions answered. You can also easily access your medical information, test results and upcoming appointments via the [employer] free online health management tool. You can learn more and sign up at [email or web address]. For assistance setting up your [employer] account, please call [phone].    Once again, we want to ensure your discharge home is safe and that you have a clear understanding of any next steps in your care. If you have any questions or concerns, please do not hesitate to contact us, we are here for you. Thank you for choosing [facility] for your healthcare needs.    Sincerely,    Your [facility] Healthcare Team

## 2019-08-20 ENCOUNTER — NON-PROVIDER VISIT (OUTPATIENT)
Dept: URGENT CARE | Facility: PHYSICIAN GROUP | Age: 44
End: 2019-08-20

## 2019-08-20 DIAGNOSIS — Z02.1 PRE-EMPLOYMENT DRUG SCREENING: ICD-10-CM

## 2019-08-20 LAB
AMP AMPHETAMINE: NORMAL
COC COCAINE: NORMAL
INT CON NEG: NORMAL
INT CON POS: NORMAL
MET METHAMPHETAMINES: NORMAL
OPI OPIATES: NORMAL
PCP PHENCYCLIDINE: NORMAL
POC DRUG COMMENT 753798-OCCUPATIONAL HEALTH: NORMAL
THC: NORMAL

## 2019-08-20 PROCEDURE — 80305 DRUG TEST PRSMV DIR OPT OBS: CPT | Performed by: PHYSICIAN ASSISTANT

## 2020-10-30 ENCOUNTER — NURSE TRIAGE (OUTPATIENT)
Dept: HEALTH INFORMATION MANAGEMENT | Facility: OTHER | Age: 45
End: 2020-10-30

## 2020-10-30 ENCOUNTER — HOSPITAL ENCOUNTER (EMERGENCY)
Facility: MEDICAL CENTER | Age: 45
End: 2020-10-30
Attending: EMERGENCY MEDICINE | Admitting: EMERGENCY MEDICINE

## 2020-10-30 VITALS
HEART RATE: 86 BPM | HEIGHT: 65 IN | BODY MASS INDEX: 22.04 KG/M2 | RESPIRATION RATE: 19 BRPM | OXYGEN SATURATION: 98 % | SYSTOLIC BLOOD PRESSURE: 175 MMHG | DIASTOLIC BLOOD PRESSURE: 90 MMHG | TEMPERATURE: 97.8 F | WEIGHT: 132.28 LBS

## 2020-10-30 DIAGNOSIS — I10 ESSENTIAL HYPERTENSION: ICD-10-CM

## 2020-10-30 DIAGNOSIS — F41.9 ANXIETY: ICD-10-CM

## 2020-10-30 PROCEDURE — 700102 HCHG RX REV CODE 250 W/ 637 OVERRIDE(OP): Performed by: EMERGENCY MEDICINE

## 2020-10-30 PROCEDURE — 99283 EMERGENCY DEPT VISIT LOW MDM: CPT

## 2020-10-30 PROCEDURE — A9270 NON-COVERED ITEM OR SERVICE: HCPCS | Performed by: EMERGENCY MEDICINE

## 2020-10-30 RX ORDER — ALPRAZOLAM 0.5 MG/1
0.5 TABLET ORAL ONCE
Status: COMPLETED | OUTPATIENT
Start: 2020-10-30 | End: 2020-10-30

## 2020-10-30 RX ORDER — LISINOPRIL 5 MG/1
5 TABLET ORAL DAILY
Qty: 30 TAB | Refills: 0 | Status: SHIPPED | OUTPATIENT
Start: 2020-10-30 | End: 2021-12-31

## 2020-10-30 RX ADMIN — ALPRAZOLAM 0.5 MG: 0.5 TABLET ORAL at 12:05

## 2020-10-30 NOTE — ED NOTES
Seen by ERP. Medicinal interventions carried out per ERP orders.  Discharge instructions provided.  Pt verbalized the understanding of discharge instructions to follow up with PCP and to return to ER if condition worsens.  Pt ambulated out of ER without difficulty. RX 1

## 2020-10-30 NOTE — ED TRIAGE NOTES
"Pt presents with concerns regarding her HTN.  She allegedly \"used to see\" her primary doctor with prescribed medications.  She has failed to F/U on her condition for the past 4 months.  She was diagnosed with a positive Covid 19 condition the 11 th of this month, and retested this past Monday.  \"I don't know the last results.\"   Chief Complaint   Patient presents with   • Hypertension     BP (!) 170/119   Pulse 99   Temp 36.6 °C (97.8 °F) (Temporal)   Resp 20   Ht 1.651 m (5' 5\")   Wt 60 kg (132 lb 4.4 oz)   SpO2 98%   BMI 22.01 kg/m²      "

## 2020-10-30 NOTE — ED PROVIDER NOTES
"ED Provider Note    CHIEF COMPLAINT  Chief Complaint   Patient presents with   • Hypertension       HPI  Mary Ann Gilbert is a 45 y.o. female who presents with complaints of elevated blood pressure.  Patient was placed on lisinopril 5 mg daily, but ran out at least 2 months ago.  She says she does not have a primary care physician in the area and just recently moved back to this area.  She said that she has been under a lot of stress, and has been feeling very anxious and like she is about to have a panic attack, so presents to the emergency department.  The patient denies any recent illness including fever, chills, sore throat, cough, vomiting, or diarrhea.  She has had no numbness or tingling to extremities or any weakness.  He denies any lightheadedness or dizziness.    REVIEW OF SYSTEMS  See HPI for further details. All other systems are negative.     PAST MEDICAL HISTORY  History reviewed. No pertinent past medical history.    FAMILY HISTORY  History reviewed. No pertinent family history.    SOCIAL HISTORY  Social History     Tobacco Use   • Smoking status: Former Smoker   • Smokeless tobacco: Never Used   • Tobacco comment: nonsmoker   Substance Use Topics   • Alcohol use: Yes     Comment: Nightly wine   • Drug use: Not Currently      Social History     Substance and Sexual Activity   Drug Use Not Currently       SURGICAL HISTORY  History reviewed. No pertinent surgical history.    CURRENT MEDICATIONS  Home Medications    **Home medications have not yet been reviewed for this encounter**         ALLERGIES  Allergies   Allergen Reactions   • Pcn [Penicillins] Anaphylaxis     Rxn - as a baby and a strong familial allergy       PHYSICAL EXAM0  VITAL SIGNS: Blood Pressure (Abnormal) 175/90   Pulse 86   Temperature 36.6 °C (97.8 °F) (Temporal)   Respiration 19   Height 1.651 m (5' 5\")   Weight 60 kg (132 lb 4.4 oz)   Oxygen Saturation 98%   Body Mass Index 22.01 kg/m²   Constitutional: Awake, alert, in no " acute distress, Non-toxic appearance.   HENT: Atraumatic. Bilateral external ears normal, mucous membranes moist, throat nonerythematous without exudates, nose is normal.  Eyes: PERRL, EOMI, conjunctiva moist, noninjected.  Neck: Nontender, Normal range of motion, No nuchal rigidity, No stridor.   Lymphatic: No lymphadenopathy noted.   Cardiovascular: Regular rate and rhythm, no murmurs, rubs, gallops.  Thorax & Lungs:  Good breath sounds bilaterally, no wheezes, rales, or retractions.  No chest tenderness.  Abdomen: Bowel sounds normal, Soft, nontender, nondistended, no rebound, guarding, masses.  Back: No CVA or spinal tenderness.  Extremities: Intact distal pulses, No edema, No tenderness.   Skin: Warm, Dry, No rashes.   Musculoskeletal: No joint swelling or tenderness.  Neurologic: Alert & oriented x 3, sensory and motor function normal. No focal deficits.   Psychiatric: Calm, cooperative, affect normal, Judgment normal, Mood normal.         COURSE & MEDICAL DECISION MAKING  Pertinent Labs & Imaging studies reviewed. (See chart for details)  The patient presents with the above complaints.  She was noted to be hypertensive on arrival.  She otherwise shows no focal neurologic deficits, has no complaints of headache or chest pain.  Her old chart was reviewed and she was seen emerge from about 3 years ago with similar complaints of hypertension and anxiety.       When further queried about her anxiety, the patient became tearful, says she has been under a lot of stress, recently had a death in the family.  She was given Xanax 0.5 mg orally.  I discussed follow-up options with the patient and will refer her for primary care follow-up.  She was told also to follow-up with behavioral health.  Patient will be restarted on lisinopril 5 mg daily.  She is to return to the ER for any further problems.    FINAL IMPRESSION  1. Essential hypertension    2. Anxiety          Electronically signed by: Bairon Ta M.D.,  10/30/2020 11:56 AM

## 2020-10-30 NOTE — TELEPHONE ENCOUNTER
+ for covid on 10/16.  Tested @ [facility].  Another test on 10/26 w/[facility] but no results back yet.      BP really high, need BP med.  Was taking lisinopril.  No insurance.  PCP not w/[facility] & will not take pt because of no shows.      Feel pretty bad w/elevated BP, has no way to test @ home.      [facility] appt not available until this afternoon,  sent to ED.

## 2021-03-27 ENCOUNTER — HOSPITAL ENCOUNTER (EMERGENCY)
Facility: MEDICAL CENTER | Age: 46
End: 2021-03-27
Attending: EMERGENCY MEDICINE

## 2021-03-27 VITALS
HEART RATE: 76 BPM | SYSTOLIC BLOOD PRESSURE: 115 MMHG | RESPIRATION RATE: 16 BRPM | OXYGEN SATURATION: 98 % | DIASTOLIC BLOOD PRESSURE: 70 MMHG | HEIGHT: 66 IN | TEMPERATURE: 98.6 F | BODY MASS INDEX: 21.35 KG/M2

## 2021-03-27 DIAGNOSIS — L03.213 PERIORBITAL CELLULITIS OF RIGHT EYE: ICD-10-CM

## 2021-03-27 PROCEDURE — 99283 EMERGENCY DEPT VISIT LOW MDM: CPT

## 2021-03-27 PROCEDURE — 700102 HCHG RX REV CODE 250 W/ 637 OVERRIDE(OP): Performed by: EMERGENCY MEDICINE

## 2021-03-27 PROCEDURE — A9270 NON-COVERED ITEM OR SERVICE: HCPCS | Performed by: EMERGENCY MEDICINE

## 2021-03-27 RX ORDER — CLINDAMYCIN HYDROCHLORIDE 150 MG/1
300 CAPSULE ORAL ONCE
Status: COMPLETED | OUTPATIENT
Start: 2021-03-27 | End: 2021-03-27

## 2021-03-27 RX ORDER — CLINDAMYCIN HYDROCHLORIDE 300 MG/1
300 CAPSULE ORAL 3 TIMES DAILY
Qty: 15 CAPSULE | Refills: 0 | Status: SHIPPED | OUTPATIENT
Start: 2021-03-27 | End: 2021-04-01

## 2021-03-27 RX ADMIN — CLINDAMYCIN HYDROCHLORIDE 300 MG: 150 CAPSULE ORAL at 02:56

## 2021-03-27 ASSESSMENT — PAIN DESCRIPTION - DESCRIPTORS: DESCRIPTORS: ACHING;PRESSURE

## 2021-03-27 ASSESSMENT — PAIN DESCRIPTION - PAIN TYPE: TYPE: ACUTE PAIN

## 2021-03-27 NOTE — ED TRIAGE NOTES
Pt c/o right eye pain for 3 days. States pain increases when moving or closing eyes. No vision issues. Wears glasses; works in factory. Redness noted on right eye lid.

## 2021-03-27 NOTE — ED PROVIDER NOTES
"ED Provider Note    ED Provider Note    Scribed for Rafaela Bradshaw MD by Rafaela Bradshaw M.D.. 3/27/2021, 2:52 AM.    Primary care provider: Malena Tovar P.A.-C.  Means of arrival: Private  History obtained from: Patient  History limited by: None    CHIEF COMPLAINT  Chief Complaint   Patient presents with    Eye Pain       HPI  Mary Ann Gilbert is a 45 y.o. female who presents to the Emergency Department for evaluation of right eyelid swelling.  She notes swelling and redness and discomfort began about 3 days ago.  She notes it is more noticeable when she closes her eye but denies any photophobia.  No pain with moving her eyes, no vision loss or vision blurring.  She notes redness and swelling isolated the upper eyelid but no drainage, no trauma though she notes she \"rubbed\" her eye at work about 3 to 4 days ago.  She does not wear contact lenses and has never had any eye surgeries.  Not a diabetic, no fevers noted at home.    REVIEW OF SYSTEMS  Pertinent positives include high discomfort without significant trauma localized to the right eyelid. Pertinent negatives include no drainage from the eye or eyelid, no significant trauma, no vision loss, no blurring of vision, no fever, no left-sided symptoms, no pain with extraocular movements.      PAST MEDICAL HISTORY   has a past medical history of Hypertension.    SURGICAL HISTORY  patient denies any surgical history    SOCIAL HISTORY  Social History     Tobacco Use    Smoking status: Former Smoker    Smokeless tobacco: Never Used    Tobacco comment: nonsmoker   Substance Use Topics    Alcohol use: Yes     Comment: Nightly wine    Drug use: Not Currently      Social History     Substance and Sexual Activity   Drug Use Not Currently           CURRENT MEDICATIONS  Home Medications       Reviewed by Marivel Bello R.N. (Registered Nurse) on 03/27/21 at 0218  Med List Status: Not Addressed     Medication Last Dose Status   acetaminophen " "(TYLENOL) 500 MG Tab 3/26/2021 Active   alprazolam (XANAX) 0.5 MG Tab 3/26/2021 Active   lisinopril (PRINIVIL) 5 MG Tab 3/26/2021 Active   lisinopril (PRINIVIL) 5 MG Tab 3/26/2021 Active   MELATONIN PO 3/26/2021 Active   VALERIAN PO 3/26/2021 Active                    ALLERGIES  Allergies   Allergen Reactions    Pcn [Penicillins] Anaphylaxis     Rxn - as a baby and a strong familial allergy       PHYSICAL EXAM  VITAL SIGNS: /70   Pulse 76   Temp 37 °C (98.6 °F) (Oral)   Resp 16   Ht 1.676 m (5' 6\")   SpO2 98%   BMI 21.35 kg/m²     General: Alert,  acute distress  Skin: Warm, dry, normal for ethnicity  Head: Normocephalic, atraumatic  Neck: Trachea midline, no tenderness  Eye: PERRL, normal conjunctiva.  Extraocular movements intact without nystagmus.  Mild erythema with minimal induration throughout the right upper eyelid, no fluctuance, no evidence of stye/hordeolum.  ENMT: Oral mucosa moist, no pharyngeal erythema or exudate  Cardiovascular: Regular rate and rhythm  Respiratory: Lungs CTA, breath sounds are equal  Musculoskeletal: No swelling, no deformity  Neurological: Alert and oriented to person, place, time, and situation  Lymphatics: No lymphadenopathy  Psychiatric: Cooperative, appropriate mood & affect      COURSE & MEDICAL DECISION MAKING  Pertinent Labs & Imaging studies reviewed. (See chart for details)    2:52 AM - Patient seen and examined at bedside. Patient will be treated with clindamycin given beta-lactam allergy.  The differential diagnoses include but are not limited to: Periorbital cellulitis    Patient Vitals for the past 24 hrs:   BP Temp Temp src Pulse Resp SpO2 Height   03/27/21 0308 115/70 -- -- 76 16 98 % --   03/27/21 0216 117/67 37 °C (98.6 °F) Oral 80 18 98 % --   03/27/21 0215 -- (!) 35.8 °C (96.4 °F) Oral -- -- -- 1.676 m (5' 6\")        Decision Making:  This is a 45 y.o. year old female who presents with atraumatic mild swelling to the right eyelid.  There is no pain " with extraocular movements, no fever, no other concerning findings on exam that would be consistent with orbital cellulitis.  Patient is nontoxic and well-appearing.  Very mild preseptal/periorbital cellulitis is the presentation here, no indication for inpatient management or advanced imaging or ophthalmologic intervention.    The patient will return for new or worsening symptoms and is stable at the time of discharge.        DISPOSITION:  Patient will be discharged home in stable condition.    FOLLOW UP:  Malena Tovar P.A.-C.  [address] [address]  [phone]    Schedule an appointment as soon as possible for a visit in 3 days      OUTPATIENT MEDICATIONS:  Discharge Medication List as of 3/27/2021  2:59 AM        START taking these medications    Details   clindamycin (CLEOCIN) 300 MG Cap Take 1 capsule by mouth 3 times a day for 5 days., Disp-15 capsule, R-0, Normal                FINAL IMPRESSION  1. Periorbital cellulitis of right eye          IRafaela M.D. (Fredy), am scribing for, and in the presence of, Rafaela Bradshaw MD.    Electronically signed by: Rafaela Bradshaw M.D. (Soniyaibpernell), 3/27/2021    IRafaela MD personally performed the services described in this documentation, as scribed by Rafaela Bradshaw M.D. in my presence, and it is both accurate and complete    The note accurately reflects work and decisions made by me.  Rafaela Bradshaw M.D.  3/27/2021  4:52 AM

## 2021-05-30 ENCOUNTER — HOSPITAL ENCOUNTER (EMERGENCY)
Facility: MEDICAL CENTER | Age: 46
End: 2021-05-30
Attending: EMERGENCY MEDICINE
Payer: COMMERCIAL

## 2021-05-30 VITALS
DIASTOLIC BLOOD PRESSURE: 68 MMHG | TEMPERATURE: 98 F | WEIGHT: 138.01 LBS | BODY MASS INDEX: 22.99 KG/M2 | RESPIRATION RATE: 16 BRPM | HEIGHT: 65 IN | OXYGEN SATURATION: 97 % | SYSTOLIC BLOOD PRESSURE: 105 MMHG | HEART RATE: 98 BPM

## 2021-05-30 DIAGNOSIS — H00.012 HORDEOLUM EXTERNUM OF RIGHT LOWER EYELID: ICD-10-CM

## 2021-05-30 PROCEDURE — 99283 EMERGENCY DEPT VISIT LOW MDM: CPT

## 2021-05-30 RX ORDER — DOXYCYCLINE 100 MG/1
100 CAPSULE ORAL 2 TIMES DAILY
Qty: 14 CAPSULE | Refills: 0 | Status: SHIPPED | OUTPATIENT
Start: 2021-05-30 | End: 2021-06-06

## 2021-05-31 NOTE — DISCHARGE INSTRUCTIONS
I would like you to do warm compresses at least 3 times a day for your hordeolum, this should resolve your symptoms, I would like you to try this for 5 days, if this fails to resolve your symptoms then you may consider taking the antibiotic that I have sent to your pharmacy.  If the redness worsens, you have a change in your vision or have any other concerns return to the emergency department or follow-up with your primary care physician.

## 2021-05-31 NOTE — ED NOTES
Patient discharged home in stable condition with significant other  AVS provided with recommended follow up and home care instructions and education information  Prescription for doxycycline electronically provided at this time  Patient and visitor verbalized understanding  Ambulatory at time of discharge

## 2021-05-31 NOTE — ED PROVIDER NOTES
"ED Provider Note    CHIEF COMPLAINT  Chief Complaint   Patient presents with   • Eye Swelling     RT lower lid since yesterday \" stye \"       HPI  Mary Ann Gilbert is a 46 y.o. female who presents with complaint of swelling on the right lower lid since yesterday.  Concerned it might be a stye.  Patient reports she has had these in the past.  Patient denies any fevers or chills or changes in vision.  Patient denies any eye trauma.  She reports she rarely uses make-up.    REVIEW OF SYSTEMS  ROS  See HPI for further details. All other systems are negative.     PAST MEDICAL HISTORY   has a past medical history of Hypertension.    SOCIAL HISTORY  Social History     Tobacco Use   • Smoking status: Current Every Day Smoker     Types: Cigarettes   • Smokeless tobacco: Never Used   • Tobacco comment: nonsmoker   Vaping Use   • Vaping Use: Never used   Substance and Sexual Activity   • Alcohol use: Yes     Comment: Nightly wine   • Drug use: Not Currently   • Sexual activity: Not on file       SURGICAL HISTORY   has a past surgical history that includes gyn surgery.    CURRENT MEDICATIONS  Home Medications    **Home medications have not yet been reviewed for this encounter**         ALLERGIES  Allergies   Allergen Reactions   • Pcn [Penicillins] Anaphylaxis     Rxn - as a baby and a strong familial allergy       PHYSICAL EXAM  Vitals:    05/30/21 2125   BP: 105/68   Pulse: 98   Resp: 16   Temp: 36.7 °C (98 °F)   SpO2: 97%       Physical Exam  Constitutional:       Appearance: She is well-developed.   HENT:      Head: Normocephalic and atraumatic.      Comments: Right eye with small area of erythema at the lower lid, there is a small focal area of inflammation in the anterior palpebra conjunctiva without conjunctival injection overlying patient's sclera.  Medial canthus is unremarkable.  Eyes:      Conjunctiva/sclera: Conjunctivae normal.   Pulmonary:      Effort: Pulmonary effort is normal.   Musculoskeletal:      Cervical " back: Normal range of motion and neck supple.   Skin:     General: Skin is warm.   Neurological:      Mental Status: She is alert and oriented to person, place, and time.   Psychiatric:         Behavior: Behavior normal.           COURSE & MEDICAL DECISION MAKING  Pertinent Labs & Imaging studies reviewed. (See chart for details)    Patient here with symptoms consistent with hordeolum.  Patient is concerned that the hordeolum is infected, I have reassured her that the inflammation appears to be as expected for a hordeolum, I have discussed at home therapy including warm compresses.  I have asked her to follow-up with her primary care physician.  I have given her a prescription of doxycycline to take only if the warm compresses fail to improve her symptoms in the next 5 days.  I have discussed return precautions.      The patient will return for worsening symptoms and is stable at the time of discharge. The patient verbalizes understanding and will comply.    FINAL IMPRESSION    1. Hordeolum externum of right lower eyelid               Electronically signed by: Ivan Grissom M.D., 5/30/2021 10:04 PM

## 2021-06-29 ENCOUNTER — NON-PROVIDER VISIT (OUTPATIENT)
Dept: URGENT CARE | Facility: PHYSICIAN GROUP | Age: 46
End: 2021-06-29

## 2021-06-29 DIAGNOSIS — Z02.1 PRE-EMPLOYMENT DRUG SCREENING: ICD-10-CM

## 2021-06-29 LAB
AMP AMPHETAMINE: NORMAL
BAR BARBITURATES: NORMAL
BZO BENZODIAZEPINES: NORMAL
COC COCAINE: NORMAL
INT CON NEG: NORMAL
INT CON POS: NORMAL
MDMA ECSTASY: NORMAL
MET METHAMPHETAMINES: NORMAL
MTD METHADONE: NORMAL
OPI OPIATES: NORMAL
OXY OXYCODONE: NORMAL
PCP PHENCYCLIDINE: NORMAL
POC URINE DRUG SCREEN OCDRS: NORMAL
THC: NORMAL

## 2021-06-29 PROCEDURE — 80305 DRUG TEST PRSMV DIR OPT OBS: CPT | Performed by: PHYSICIAN ASSISTANT

## 2021-07-12 ENCOUNTER — NON-PROVIDER VISIT (OUTPATIENT)
Dept: OCCUPATIONAL MEDICINE | Facility: CLINIC | Age: 46
End: 2021-07-12

## 2021-07-12 PROCEDURE — 8911 PR MRO FEE: Performed by: NURSE PRACTITIONER

## 2021-07-14 ENCOUNTER — OFFICE VISIT (OUTPATIENT)
Dept: URGENT CARE | Facility: CLINIC | Age: 46
End: 2021-07-14

## 2021-07-14 VITALS
HEIGHT: 65 IN | WEIGHT: 138 LBS | HEART RATE: 84 BPM | TEMPERATURE: 98.2 F | BODY MASS INDEX: 22.99 KG/M2 | OXYGEN SATURATION: 96 % | RESPIRATION RATE: 16 BRPM | DIASTOLIC BLOOD PRESSURE: 84 MMHG | SYSTOLIC BLOOD PRESSURE: 122 MMHG

## 2021-07-14 DIAGNOSIS — Z02.89 ENCOUNTER FOR PHYSICAL EXAMINATION RELATED TO EMPLOYMENT: ICD-10-CM

## 2021-07-14 PROCEDURE — 8915 PR COMPREHENSIVE PHYSICAL: Performed by: PHYSICIAN ASSISTANT

## 2021-07-14 RX ORDER — ESCITALOPRAM OXALATE 5 MG/1
5 TABLET ORAL
Status: SHIPPED | COMMUNITY
Start: 2021-06-14 | End: 2021-07-14

## 2021-07-14 NOTE — PROGRESS NOTES
Patient presents for preemployment physical.  She takes lisinopril daily for hypertension.  This has been working well.  She takes a Xanax as needed, typically every other day, for anxiety.  Her employer is aware that she is prescribed and taking this medication.  See scanned paperwork.

## 2021-11-26 ENCOUNTER — HOSPITAL ENCOUNTER (EMERGENCY)
Facility: MEDICAL CENTER | Age: 46
End: 2021-11-27
Attending: EMERGENCY MEDICINE
Payer: MEDICAID

## 2021-11-26 DIAGNOSIS — B95.8 STAPH INFECTION: ICD-10-CM

## 2021-11-26 DIAGNOSIS — H92.01 RIGHT EAR PAIN: ICD-10-CM

## 2021-11-26 PROCEDURE — 99282 EMERGENCY DEPT VISIT SF MDM: CPT

## 2021-11-27 VITALS
BODY MASS INDEX: 23.65 KG/M2 | HEART RATE: 71 BPM | OXYGEN SATURATION: 97 % | WEIGHT: 141.98 LBS | RESPIRATION RATE: 18 BRPM | SYSTOLIC BLOOD PRESSURE: 129 MMHG | TEMPERATURE: 97 F | HEIGHT: 65 IN | DIASTOLIC BLOOD PRESSURE: 91 MMHG

## 2021-11-27 PROCEDURE — 700102 HCHG RX REV CODE 250 W/ 637 OVERRIDE(OP): Performed by: EMERGENCY MEDICINE

## 2021-11-27 PROCEDURE — A9270 NON-COVERED ITEM OR SERVICE: HCPCS | Performed by: EMERGENCY MEDICINE

## 2021-11-27 RX ORDER — CEPHALEXIN 250 MG/1
500 CAPSULE ORAL ONCE
Status: COMPLETED | OUTPATIENT
Start: 2021-11-27 | End: 2021-11-27

## 2021-11-27 RX ORDER — CEPHALEXIN 500 MG/1
500 CAPSULE ORAL 4 TIMES DAILY
Qty: 40 CAPSULE | Refills: 0 | Status: SHIPPED | OUTPATIENT
Start: 2021-11-27 | End: 2021-12-31

## 2021-11-27 RX ADMIN — CEPHALEXIN 500 MG: 250 CAPSULE ORAL at 00:30

## 2021-11-27 NOTE — ED TRIAGE NOTES
"Chief Complaint   Patient presents with   • Ear Pain     Patient walk-in with boyfriend. Per pt, x1 week of RIGHT upper ear lobe pain. Lobe is red and mildly swollen.     /95   Pulse 68   Temp 35.8 °C (96.5 °F) (Temporal)   Resp 16   Ht 1.651 m (5' 5\")   Wt 64.4 kg (141 lb 15.6 oz)   SpO2 98%  RA    Has this patient been vaccinated for COVID NO  If not, would they like to be vaccinated while in the ER if eligible?  NO  Would the patient like to speak with the ERP about the possibility of receiving the COVID vaccine today before making a decision? NO  "

## 2021-11-27 NOTE — ED PROVIDER NOTES
ED Provider Note     Scribed for Farzana Conti D.O. by Anthony Link. 2021, 12:05 AM.     Primary care provider: Malena Tovar P.A.-C.  Means of arrival: Walk-in         History obtained from: Patient  History limited by: None    CHIEF COMPLAINT  Chief Complaint   Patient presents with   • Ear Pain     Patient walk-in with boyfriend. Per pt, x1 week of RIGHT upper ear lobe pain. Lobe is red and mildly swollen.       HPI  Mary Ann Gilbert is a 46 y.o. female who presents to the emergency Department acute, mild right ear pain onset a week ago. Per patient, she has erythema on her upper right ear lobe which is tender to touch. She is unsure how she got the swelling. She has associated symptoms of chills but denies fever. She took ibuprofen with mild alleviation.     REVIEW OF SYSTEMS  Pertinent positives include right ear pain and chills. Pertinent negatives include no fever.   See HPI for further details. All other systems are negative.    PAST MEDICAL HISTORY  Past Medical History:   Diagnosis Date   • Hypertension        FAMILY HISTORY  History reviewed. No pertinent family history.    SOCIAL HISTORY  Social History     Tobacco Use   • Smoking status: Current Every Day Smoker     Packs/day: 0.50     Types: Cigarettes   • Smokeless tobacco: Never Used   Vaping Use   • Vaping Use: Never used   Substance Use Topics   • Alcohol use: Yes     Comment: Nightly wine   • Drug use: Not Currently      Social History     Substance and Sexual Activity   Drug Use Not Currently       SURGICAL HISTORY  Past Surgical History:   Procedure Laterality Date   • GYN SURGERY             CURRENT MEDICATIONS    Current Facility-Administered Medications:   •  [COMPLETED] cephALEXin (KEFLEX) capsule 500 mg, 500 mg, Oral, Once, Farzana Conti D.O., 500 mg at 21 0030    Current Outpatient Medications:   •  cephALEXin (KEFLEX) 500 MG Cap, Take 1 Capsule by mouth 4 times a day., Disp: 40 Capsule, Rfl: 0  •  mupirocin  "(BACTROBAN) 2 % Ointment, Apply 1 Application topically 3 times a day. Until infection gone., Disp: 22 g, Rfl: 0  •  lisinopril (PRINIVIL) 5 MG Tab, Take 1 Tab by mouth every day., Disp: 30 Tab, Rfl: 0  •  lisinopril (PRINIVIL) 5 MG Tab, Take 1 Tab by mouth every day., Disp: 30 Tab, Rfl: 0  •  alprazolam (XANAX) 0.5 MG Tab, Take 1 Tab by mouth 2 Times a Day., Disp: 24 Tab, Rfl: 0    ALLERGIES  Allergies   Allergen Reactions   • Pcn [Penicillins] Anaphylaxis     Rxn - as a baby and a strong familial allergy       PHYSICAL EXAM  VITAL SIGNS: /95   Pulse 68   Temp 35.8 °C (96.5 °F) (Temporal)   Resp 16   Ht 1.651 m (5' 5\")   Wt 64.4 kg (141 lb 15.6 oz)   SpO2 98%   BMI 23.63 kg/m²     Constitutional: Patient is well developed, well nourished. Non-toxic appearing. Mild distress.   HENT: Normocephalic, atraumatic. Bilateral external auditory canals normal without drainage or cerumen. No drainage or signs of infection. TM's clear bilaterally. Right superior pinna is erythematous and edematous with crusty drainage.  Lymphatic: No cervical lymphadenopathy noted.   Cardiovascular: Normal heart rate and Regular rhythm. No murmur.  Thorax & Lungs: No respiratory distress, no rhonchi, wheezing or rales  Skin: Warm, Dry, No erythema, No rashes.   Extremities: Peripheral pulses 4/4 No edema, No tenderness  Psychiatric: Affect normal, Judgment normal, Mood normal.    COURSE & MEDICAL DECISION MAKING  Pertinent Labs & Imaging studies reviewed. (See chart for details)    12:05 AM - Patient seen and evaluated at bedside. Patient presents today with right ear pain onset a week ago. She is experiencing chills but denies fever. On exam, her right superior pinna is erythematous and edematous with crusty drainage. I suspect the patient has a staph infection. The rest of exam is otherwise normal. Discussed plan of discharge. She will be prescribed Keflex. Patient understands and verbalizes agreement to the plan of discharge. " Patient will be treated with Keflex 500 mg PO for her symptoms.      The patient will return for new or worsening symptoms and is stable at the time of discharge.    DISPOSITION:  Patient will be discharged home in stable condition.    FOLLOW UP:  Malena Tovar P.A.-C.  [address] [address]  [phone]    Schedule an appointment as soon as possible for a visit in 1 week  As needed, If symptoms worsen    OUTPATIENT MEDICATIONS:  New Prescriptions    CEPHALEXIN (KEFLEX) 500 MG CAP    Take 1 Capsule by mouth 4 times a day.    MUPIROCIN (BACTROBAN) 2 % OINTMENT    Apply 1 Application topically 3 times a day. Until infection gone.     FINAL IMPRESSION  1. Right ear pain    2. Staph infection      Anthony PUGA (Scribe), am scribing for, and in the presence of, Farzana Conti D.O..    Electronically signed by: Anthony Link (Scribe), 11/27/2021    Farzana PUGA D.O. personally performed the services described in this documentation, as scribed by Anthony Link in my presence, and it is both accurate and complete.    The note accurately reflects work and decisions made by me.  Farzana Conti D.O.  11/27/2021  7:21 AM

## 2021-11-27 NOTE — DISCHARGE INSTRUCTIONS
Use the ointment I will be prescribing you 3 times a day to the affected area, clean it with warm soapy water in between each application so that you do not layer the medication.  Take the antibiotics until completely gone  Ibuprofen for pain  Follow-up with your primary care provider in 1 week for recheck.

## 2021-12-31 ENCOUNTER — OFFICE VISIT (OUTPATIENT)
Dept: URGENT CARE | Facility: CLINIC | Age: 46
End: 2021-12-31

## 2021-12-31 VITALS
SYSTOLIC BLOOD PRESSURE: 124 MMHG | HEIGHT: 62 IN | TEMPERATURE: 98.2 F | WEIGHT: 139 LBS | RESPIRATION RATE: 14 BRPM | OXYGEN SATURATION: 96 % | HEART RATE: 86 BPM | BODY MASS INDEX: 25.58 KG/M2 | DIASTOLIC BLOOD PRESSURE: 86 MMHG

## 2021-12-31 DIAGNOSIS — L08.9 SKIN INFECTION: ICD-10-CM

## 2021-12-31 PROCEDURE — 99213 OFFICE O/P EST LOW 20 MIN: CPT | Performed by: NURSE PRACTITIONER

## 2021-12-31 RX ORDER — DOXYCYCLINE HYCLATE 100 MG
100 TABLET ORAL 2 TIMES DAILY
Qty: 14 TABLET | Refills: 0 | Status: SHIPPED | OUTPATIENT
Start: 2021-12-31 | End: 2022-01-07

## 2021-12-31 ASSESSMENT — ENCOUNTER SYMPTOMS
FEVER: 0
NAUSEA: 0
HEADACHES: 0
CHILLS: 0
MYALGIAS: 0
DIARRHEA: 0

## 2021-12-31 NOTE — PROGRESS NOTES
Subjective     Mary Ann Gilbert is a 46 y.o. female who presents with Wound Infection (1x month, bilateral skin infection behind ears )            HPI  New problem.  Patient is a 46-year-old female who presents with possible skin infection behind bilateral ears for approximately 1 month.  She reports pain and broken skin behind this area.  She denies fever, chills, headache, myalgia, nausea.  She has not taken any medications for this.    Pcn [penicillins]  Current Outpatient Medications on File Prior to Visit   Medication Sig Dispense Refill   • lisinopril (PRINIVIL) 5 MG Tab Take 1 Tab by mouth every day. 30 Tab 0   • alprazolam (XANAX) 0.5 MG Tab Take 1 Tab by mouth 2 Times a Day. 24 Tab 0     No current facility-administered medications on file prior to visit.     Social History     Socioeconomic History   • Marital status: Single     Spouse name: Not on file   • Number of children: Not on file   • Years of education: Not on file   • Highest education level: Not on file   Occupational History   • Not on file   Tobacco Use   • Smoking status: Current Every Day Smoker     Packs/day: 0.50     Types: Cigarettes   • Smokeless tobacco: Never Used   Vaping Use   • Vaping Use: Never used   Substance and Sexual Activity   • Alcohol use: Yes     Comment: Nightly wine   • Drug use: Not Currently   • Sexual activity: Not on file   Other Topics Concern   • Not on file   Social History Narrative   • Not on file     Social Determinants of Health     Financial Resource Strain:    • Difficulty of Paying Living Expenses: Not on file   Food Insecurity:    • Worried About Running Out of Food in the Last Year: Not on file   • Ran Out of Food in the Last Year: Not on file   Transportation Needs:    • Lack of Transportation (Medical): Not on file   • Lack of Transportation (Non-Medical): Not on file   Physical Activity:    • Days of Exercise per Week: Not on file   • Minutes of Exercise per Session: Not on file   Stress:    •  "Feeling of Stress : Not on file   Social Connections:    • Frequency of Communication with Friends and Family: Not on file   • Frequency of Social Gatherings with Friends and Family: Not on file   • Attends [employer] Services: Not on file   • Active Member of Clubs or Organizations: Not on file   • Attends Club or Organization Meetings: Not on file   • Marital Status: Not on file   Intimate Partner Violence:    • Fear of Current or Ex-Partner: Not on file   • Emotionally Abused: Not on file   • Physically Abused: Not on file   • Sexually Abused: Not on file   Housing Stability:    • Unable to Pay for Housing in the Last Year: Not on file   • Number of Places Lived in the Last Year: Not on file   • Unstable Housing in the Last Year: Not on file     Breast Cancer-related family history is not on file.    Review of Systems   Constitutional: Negative for chills and fever.   Gastrointestinal: Negative for diarrhea and nausea.   Musculoskeletal: Negative for myalgias.   Skin: Positive for itching and rash.   Neurological: Negative for headaches.              Objective     /86 (BP Location: Left arm, Patient Position: Sitting, BP Cuff Size: Adult)   Pulse 86   Temp 36.8 °C (98.2 °F) (Temporal)   Resp 14   Ht 1.575 m (5' 2\")   Wt 63 kg (139 lb)   SpO2 96%   BMI 25.42 kg/m²      Physical Exam  Constitutional:       Appearance: Normal appearance.   HENT:      Head: Normocephalic.      Right Ear: Tympanic membrane normal.      Left Ear: Tympanic membrane normal.   Cardiovascular:      Rate and Rhythm: Normal rate and regular rhythm.      Heart sounds: No murmur heard.      Pulmonary:      Effort: Pulmonary effort is normal.      Breath sounds: Normal breath sounds.   Skin:     General: Skin is warm and dry.      Comments: Mild skin excoriation to the posterior aspect of both of her ears.  Tenderness to palpation.  She does have bilateral post auricular lymph node enlargement.   Neurological:      General: No focal " deficit present.      Mental Status: She is alert and oriented to person, place, and time.   Psychiatric:         Mood and Affect: Mood normal.         Behavior: Behavior normal.                             Assessment & Plan        1. Skin infection  doxycycline (VIBRAMYCIN) 100 MG Tab     Patient advised on skin care.  She is advised to just wash the areas twice daily with regular soap and water.  She may apply Neosporin ointment bilaterally 2-3 times per day.  She is given a 7-day course of doxycycline and advised to follow-up if symptoms are not improving.

## 2022-01-10 ENCOUNTER — HOSPITAL ENCOUNTER (EMERGENCY)
Facility: MEDICAL CENTER | Age: 47
End: 2022-01-10
Attending: EMERGENCY MEDICINE
Payer: COMMERCIAL

## 2022-01-10 VITALS
BODY MASS INDEX: 24.16 KG/M2 | DIASTOLIC BLOOD PRESSURE: 106 MMHG | WEIGHT: 145 LBS | SYSTOLIC BLOOD PRESSURE: 151 MMHG | OXYGEN SATURATION: 98 % | RESPIRATION RATE: 15 BRPM | HEART RATE: 89 BPM | TEMPERATURE: 96 F | HEIGHT: 65 IN

## 2022-01-10 DIAGNOSIS — H61.033 CHONDRITIS OF AURICLE, BILATERAL: ICD-10-CM

## 2022-01-10 PROCEDURE — 99281 EMR DPT VST MAYX REQ PHY/QHP: CPT

## 2022-01-10 RX ORDER — CIPROFLOXACIN 500 MG/1
500 TABLET, FILM COATED ORAL 2 TIMES DAILY
Qty: 14 TABLET | Refills: 0 | Status: SHIPPED | OUTPATIENT
Start: 2022-01-10 | End: 2022-01-17

## 2022-01-10 RX ORDER — PREDNISONE 20 MG/1
60 TABLET ORAL DAILY
Qty: 15 TABLET | Refills: 0 | Status: SHIPPED | OUTPATIENT
Start: 2022-01-10 | End: 2022-01-15

## 2022-01-10 RX ORDER — TRIAMCINOLONE ACETONIDE 1 MG/G
1 CREAM TOPICAL 2 TIMES DAILY
Qty: 1 EACH | Refills: 0 | Status: SHIPPED | OUTPATIENT
Start: 2022-01-10

## 2022-01-10 NOTE — ED TRIAGE NOTES
"Chief Complaint   Patient presents with   • Ear Pain     Bilat, x 1.5 months ago, feels is getting worse     /113 Comment: Hx of HTN  Pulse (!) 107   Temp 36.4 °C (97.6 °F) (Temporal)   Resp 15   Ht 1.651 m (5' 5\")   Wt 63.9 kg (140 lb 14 oz)   SpO2 99%   BMI 23.44 kg/m²     Has this patient been vaccinated for COVID NO  If not, would they like to be vaccinated while in the ER if eligible?  NO  Would the patient like to speak with the ERP about the possibility of receiving the COVID vaccine today before making a decision? NO    Pt ambulated to ED by self for c/o skin infection bilat ears x 1.5 months.  Pt states is not getting any better.      "

## 2022-01-10 NOTE — ED NOTES
Pt given d/c paperwrok and RX x3, pt verbalized udnerstanding all information given. Pt ambulated out of the ER w/o difficulty

## 2022-01-10 NOTE — ED PROVIDER NOTES
"ED Provider Note    CHIEF COMPLAINT  Chief Complaint   Patient presents with   • Ear Pain     Bilat, x 1.5 months ago, feels is getting worse       HPI  Mary Ann Gilbert is a 46 y.o. female who presents to the Emergency Department for a return visit.  She was initially seen in November when she had swelling and tenderness of her right ear on the upper outer lobe.  She denied any trauma at that time and she was given Keflex for treatment.  She did not take the Keflex as she has a penicillin allergy.  She states that the symptoms then spread to her left outer ear and now both of them are swollen red and draining.  She went to the urgent care and was given a course of doxycycline which she completed but the symptoms are worsening.  She states it all started after she tried some new products for her hair.  She does have some itching of her scalp.  She has had no fever.  She denies any other joint pain or swelling she does not have diabetes or other medical problems.    REVIEW OF SYSTEMS  As above, all other systems negative.  PAST MEDICAL HISTORY   has a past medical history of Hypertension.    SOCIAL HISTORY  Social History     Tobacco Use   • Smoking status: Current Every Day Smoker     Packs/day: 0.50     Types: Cigarettes   • Smokeless tobacco: Never Used   Vaping Use   • Vaping Use: Never used   Substance and Sexual Activity   • Alcohol use: Yes   • Drug use: Not Currently   • Sexual activity: Not on file       SURGICAL HISTORY   has a past surgical history that includes gyn surgery.    CURRENT MEDICATIONS  Reviewed.  See Encounter Summary.   ALLERGIES  Allergies   Allergen Reactions   • Pcn [Penicillins] Anaphylaxis     Rxn - as a baby and a strong familial allergy       PHYSICAL EXAM  VITAL SIGNS: /113 Comment: Hx of HTN  Pulse (!) 107   Temp 36.4 °C (97.6 °F) (Temporal)   Resp 15   Ht 1.651 m (5' 5\")   Wt 63.9 kg (140 lb 14 oz)   SpO2 99%   BMI 23.44 kg/m²   Constitutional: Pleasant, Alert in no " apparent distress.  HENT: Normocephalic, Bilateral external ears swollen in the area of the helix and antihelix, the earlobe itself is spared, and the posterior side there are shallow ulcerations they are oozing clear fluid, there are some scabbing lesions between the scalp and the area themselves, she does have scattered scalp lesions as well that are erythematous  Eyes: Pupils are equal and reactive. Conjunctiva normal, non-icteric.   Thorax & Lungs: Easy unlabored respirations  Abdomen:  No gross signs of peritonitis, no pain with movement   Skin: Visualized skin is  Dry, No erythema, No rash.   Extremities:   No edema, No asymmetry  Neurologic: Alert, Grossly non-focal.   Psychiatric: Affect and Mood normal      COURSE & MEDICAL DECISION MAKING  Nursing notes and vital signs were reviewed. (See chart for details)    The patient presents to the Emergency Department with bilateral swollen earlobes.  The fact that this is bilateral makes me think that this may be more of an allergic response to the hair dyes that she used just prior to the initiation of that she states she has been using new hair products.  She also has an itchy scalp.  However this also could represent a cartilage inflammation or infection she certainly has skin breakdown and and and is at risk for infection.  Most of her auricular perichondritis is caused by Pseudomonas so I think we need to cover her with ciprofloxacin.  Additionally I do have a grave concern that this may be related to an allergic etiology so I am going to place her on steroids both topical triamcinolone and oral prednisone.  At this time she does not have insurance or a primary care listed so I will refer her to the [facility] clinic so they can refer her to ENT for ongoing management.  She is not toxic febrile no signs of abscess and I feel she is appropriate for outpatient work-up.          The patient was discharged home with an information sheet on perichondritis and told to  return immediately for any signs or symptoms listed, or any unexpected symptoms.  The patient verbally agreed to the discharge precautions and follow-up plan which is documented in EPIC.  DISPOSITION:    Patient will be discharged home in stable condition.    FOLLOW UP:  [facility]  [address] [facility] [address]  [phone]  Schedule an appointment as soon as possible for a visit   call Monday at 8 am for new patient appointment      OUTPATIENT MEDICATIONS:  New Prescriptions    CIPROFLOXACIN (CIPRO) 500 MG TAB    Take 1 Tablet by mouth 2 times a day for 7 days.    PREDNISONE (DELTASONE) 20 MG TAB    Take 3 Tablets by mouth every day for 5 days.    TRIAMCINOLONE ACETONIDE (KENALOG) 0.1 % CREAM    Apply 1 Application topically 2 times a day.         FINAL IMPRESSION     Bilateral Auricular Perichondritis

## 2023-06-13 ENCOUNTER — OFFICE VISIT (OUTPATIENT)
Dept: URGENT CARE | Facility: PHYSICIAN GROUP | Age: 48
End: 2023-06-13

## 2023-06-13 VITALS
RESPIRATION RATE: 12 BRPM | OXYGEN SATURATION: 98 % | DIASTOLIC BLOOD PRESSURE: 76 MMHG | TEMPERATURE: 98.4 F | HEIGHT: 64 IN | BODY MASS INDEX: 23.39 KG/M2 | SYSTOLIC BLOOD PRESSURE: 124 MMHG | HEART RATE: 90 BPM | WEIGHT: 137 LBS

## 2023-06-13 DIAGNOSIS — T78.40XA ALLERGIC REACTION, INITIAL ENCOUNTER: ICD-10-CM

## 2023-06-13 PROCEDURE — 3074F SYST BP LT 130 MM HG: CPT | Performed by: FAMILY MEDICINE

## 2023-06-13 PROCEDURE — 3078F DIAST BP <80 MM HG: CPT | Performed by: FAMILY MEDICINE

## 2023-06-13 PROCEDURE — 99213 OFFICE O/P EST LOW 20 MIN: CPT | Performed by: FAMILY MEDICINE

## 2023-06-13 NOTE — LETTER
June 13, 2023    To Whom It May Concern:         This is confirmation that Mary Ann Valderrama Vladimir attended her scheduled appointment with Yumiko Adamson M.D. on 6/13/23. She may return to work tomorrow without any restrictions.          If you have any questions please do not hesitate to call me at the phone number listed below.    Sincerely,          Yumiko Adamson M.D.  [phone]

## 2023-06-13 NOTE — PROGRESS NOTES
"  Subjective:      48 y.o. female presents to urgent care for concerns of an allergic reaction to her hair dye. On Saturday she dyed her hair with a brand that she has used before, but on Sunday started noticing itching and rash to her scalp.  This morning when she woke up she noticed there was some swelling from her hairline down to her right eye.  She did take a Claritin this morning which seems to have stopped the itching.  No associated numbness/tingling to mouth/lips.  No difficulties with breathing or swallowing.    She denies any other questions or concerns at this time.    Current problem list, medication, and past medical/surgical history were reviewed in Epic.    ROS  See HPI     Objective:      Pulse 90   Temp 36.9 °C (98.4 °F)   Resp 12   Ht 1.626 m (5' 4\")   Wt 62.1 kg (137 lb)   SpO2 98%   BMI 23.52 kg/m²     Physical Exam  Constitutional:       General: She is not in acute distress.     Appearance: She is not diaphoretic.   HENT:      Mouth/Throat:      Tongue: Tongue does not deviate from midline.      Palate: No lesions.      Pharynx: Uvula midline. No posterior oropharyngeal erythema.   Cardiovascular:      Rate and Rhythm: Normal rate and regular rhythm.      Heart sounds: Normal heart sounds.   Pulmonary:      Effort: Pulmonary effort is normal. No respiratory distress.      Breath sounds: Normal breath sounds.   Skin:     Findings: Rash (Urticaria to current hairline and temples.  No issues to the top or back of her head.) present.   Neurological:      Mental Status: She is alert.   Psychiatric:         Mood and Affect: Affect normal.         Judgment: Judgment normal.     Assessment/Plan:     1. Allergic reaction, initial encounter  Contained to scalp/right eye.  I do not believe there is a need for systemic steroids.  She is encouraged to use Claritin 2-3 times daily for the next couple of days.  Avoid scratching.      Instructed to return to Urgent Care or nearest Emergency Department " if symptoms fail to improve, for any change in condition, further concerns, or new concerning symptoms. Patient states understanding of the plan of care and discharge instructions.    Yumiko Adamson M.D.

## 2023-08-23 ENCOUNTER — HOSPITAL ENCOUNTER (OUTPATIENT)
Facility: MEDICAL CENTER | Age: 48
End: 2023-08-23
Payer: COMMERCIAL

## 2023-08-23 ENCOUNTER — OFFICE VISIT (OUTPATIENT)
Dept: URGENT CARE | Facility: CLINIC | Age: 48
End: 2023-08-23
Payer: COMMERCIAL

## 2023-08-23 VITALS
OXYGEN SATURATION: 100 % | TEMPERATURE: 97.5 F | DIASTOLIC BLOOD PRESSURE: 68 MMHG | HEART RATE: 92 BPM | SYSTOLIC BLOOD PRESSURE: 112 MMHG | BODY MASS INDEX: 23.05 KG/M2 | HEIGHT: 64 IN | WEIGHT: 135 LBS | RESPIRATION RATE: 16 BRPM

## 2023-08-23 DIAGNOSIS — N89.8 VAGINAL DISCHARGE: ICD-10-CM

## 2023-08-23 DIAGNOSIS — N30.00 ACUTE CYSTITIS WITHOUT HEMATURIA: ICD-10-CM

## 2023-08-23 DIAGNOSIS — R10.9 FLANK PAIN: ICD-10-CM

## 2023-08-23 DIAGNOSIS — M54.41 ACUTE RIGHT-SIDED LOW BACK PAIN WITH RIGHT-SIDED SCIATICA: ICD-10-CM

## 2023-08-23 LAB
APPEARANCE UR: CLEAR
BILIRUB UR STRIP-MCNC: NEGATIVE MG/DL
COLOR UR AUTO: NORMAL
GLUCOSE UR STRIP.AUTO-MCNC: NEGATIVE MG/DL
KETONES UR STRIP.AUTO-MCNC: NEGATIVE MG/DL
LEUKOCYTE ESTERASE UR QL STRIP.AUTO: NORMAL
NITRITE UR QL STRIP.AUTO: NEGATIVE
PH UR STRIP.AUTO: 5.5 [PH] (ref 5–8)
PROT UR QL STRIP: NEGATIVE MG/DL
RBC UR QL AUTO: NEGATIVE
SP GR UR STRIP.AUTO: >=1.03
UROBILINOGEN UR STRIP-MCNC: 0.2 MG/DL

## 2023-08-23 PROCEDURE — 87660 TRICHOMONAS VAGIN DIR PROBE: CPT

## 2023-08-23 PROCEDURE — 87480 CANDIDA DNA DIR PROBE: CPT

## 2023-08-23 PROCEDURE — 87086 URINE CULTURE/COLONY COUNT: CPT

## 2023-08-23 PROCEDURE — 87510 GARDNER VAG DNA DIR PROBE: CPT

## 2023-08-23 PROCEDURE — 81002 URINALYSIS NONAUTO W/O SCOPE: CPT

## 2023-08-23 PROCEDURE — 3074F SYST BP LT 130 MM HG: CPT

## 2023-08-23 PROCEDURE — 3078F DIAST BP <80 MM HG: CPT

## 2023-08-23 PROCEDURE — 99213 OFFICE O/P EST LOW 20 MIN: CPT

## 2023-08-23 PROCEDURE — 87077 CULTURE AEROBIC IDENTIFY: CPT

## 2023-08-23 RX ORDER — PREDNISONE 20 MG/1
TABLET ORAL
Qty: 11 TABLET | Refills: 0 | Status: SHIPPED | OUTPATIENT
Start: 2023-08-23

## 2023-08-23 RX ORDER — CIPROFLOXACIN 500 MG/1
500 TABLET, FILM COATED ORAL EVERY 12 HOURS
Qty: 14 TABLET | Refills: 0 | Status: SHIPPED | OUTPATIENT
Start: 2023-08-23 | End: 2023-08-30

## 2023-08-23 RX ORDER — KETOROLAC TROMETHAMINE 30 MG/ML
15 INJECTION, SOLUTION INTRAMUSCULAR; INTRAVENOUS ONCE
Status: COMPLETED | OUTPATIENT
Start: 2023-08-23 | End: 2023-08-23

## 2023-08-23 RX ADMIN — KETOROLAC TROMETHAMINE 15 MG: 30 INJECTION, SOLUTION INTRAMUSCULAR; INTRAVENOUS at 11:42

## 2023-08-23 ASSESSMENT — ENCOUNTER SYMPTOMS: BACK PAIN: 1

## 2023-08-23 NOTE — LETTER
August 23, 2023    To Whom It May Concern:         This is confirmation that Mary Ann Gilbert attended her scheduled appointment with JESSE Trotter on 8/23/23. Please excuse her from work on 8/23 and 8/24 due to an acute illness.         If you have any questions please do not hesitate to call me at the phone number listed below.    Sincerely,          Dayanna Jones A.P.R.N.  [phone]

## 2023-08-23 NOTE — PROGRESS NOTES
"Subjective:   Mary Ann Gilbert is a very pleasant 48 y.o. female who presents for:    Chief Complaint   Patient presents with    Back Pain     Lower back pain        HPI:    The patient presents to the urgent care with RS lower back pain that has been present over the last two days. She endorses that she engages in repetitive lifting of objects > 50 lbs at work and may have injured it. She cannot recall a specific traumatic event that led to the back discomfort. No pre-existing injuries. The pain is described as sharp, \"comes and goes,\" isolated to the right side, and intermittently shoots down the right leg.  Associated symptoms include clear vaginal discharge that is clear. Last week she was having pain with urination, frequency. These symptoms went away without treatment. Yesterday she experienced nausea and abdominal pain, which has subsided. Denies numbness, tingling, weakness of the right leg, fevers, loss of B/B, chills, myalgias.     ROS:    Review of Systems   Constitutional:  Negative for chills, diaphoresis, fever, malaise/fatigue and weight loss.   Gastrointestinal:  Negative for abdominal pain, diarrhea, nausea and vomiting.   Genitourinary:  Negative for dysuria, flank pain, frequency, hematuria and urgency.        Vaginal discharge   Musculoskeletal:  Positive for back pain. Negative for falls, joint pain, myalgias and neck pain.   All other systems reviewed and are negative.      Medications:      Current Outpatient Medications   Medication Sig    lisinopril (PRINIVIL) 5 MG Tab Take 1 Tab by mouth every day.    alprazolam (XANAX) 0.5 MG Tab Take 1 Tab by mouth 2 Times a Day.    triamcinolone acetonide (KENALOG) 0.1 % Cream Apply 1 Application topically 2 times a day. (Patient not taking: Reported on 8/23/2023)       Allergies:     Allergies   Allergen Reactions    Pcn [Penicillins] Anaphylaxis     Rxn - as a baby and a strong familial allergy       Problem List:     There is no problem list on " file for this patient.      Surgical History:    Past Surgical History:   Procedure Laterality Date    GYN SURGERY             Past Social Hx:     Social History     Socioeconomic History    Marital status: Single   Tobacco Use    Smoking status: Every Day     Current packs/day: 0.50     Types: Cigarettes    Smokeless tobacco: Never   Vaping Use    Vaping Use: Never used   Substance and Sexual Activity    Alcohol use: Yes     Comment: occ    Drug use: Not Currently        Past Family Hx:      History reviewed. No pertinent family history.    Problem list, medications, and allergies reviewed by myself today in Epic.     Objective:     Vitals:    23 1036   BP: 112/68   Pulse: 92   Resp: 16   Temp: 36.4 °C (97.5 °F)   SpO2: 100%       Physical Exam  Vitals reviewed.   Constitutional:       General: She is not in acute distress.     Appearance: Normal appearance. She is not ill-appearing, toxic-appearing or diaphoretic.   HENT:      Head: Normocephalic and atraumatic.      Right Ear: Tympanic membrane, ear canal and external ear normal.      Left Ear: Tympanic membrane, ear canal and external ear normal.      Nose: Nose normal.      Mouth/Throat:      Mouth: Mucous membranes are moist.      Pharynx: Oropharynx is clear.   Eyes:      Extraocular Movements: Extraocular movements intact.      Conjunctiva/sclera: Conjunctivae normal.      Pupils: Pupils are equal, round, and reactive to light.   Cardiovascular:      Rate and Rhythm: Normal rate and regular rhythm.      Pulses: Normal pulses.      Heart sounds: Normal heart sounds.   Pulmonary:      Effort: Pulmonary effort is normal.      Breath sounds: Normal breath sounds.   Abdominal:      General: Abdomen is flat. There is no distension.      Palpations: Abdomen is soft. There is no mass.      Tenderness: There is no abdominal tenderness. There is no right CVA tenderness, left CVA tenderness, guarding or rebound.      Hernia: No hernia is present.    Musculoskeletal:      Cervical back: Normal range of motion.      Lumbar back: Tenderness present. No lacerations or spasms. Normal range of motion. Positive right straight leg raise test. Negative left straight leg raise test.        Back:       Comments: Back:  TTP over right lumbar area without overlying skin changes. Pain reproducible with palpation.  General: No asymmetry, bruising, or erythema appreciated  ROM: flexion 90°, extension 30°, lateral bend 30°, lateral twist 30°  Palpation: No tenderness to palpation of spinous processes, no step-offs appreciated, no tenderness to palpation of paraspinal muscles, no significant scoliosis appreciated  Strength: 5/5 hip flexion/extension  Special tests: Straight leg raise + RS  Neuro: Sensation intact and equal bilaterally in LE's    Skin:     General: Skin is warm and dry.   Neurological:      General: No focal deficit present.      Mental Status: She is alert and oriented to person, place, and time.   Psychiatric:         Mood and Affect: Mood normal.         Behavior: Behavior normal.         Thought Content: Thought content normal.       Lab Results   Component Value Date/Time    POCCOLOR Dark Yellow 08/23/2023 04:28 PM    POCAPPEAR Clear 08/23/2023 04:28 PM    POCLEUKEST Trace 08/23/2023 04:28 PM    POCNITRITE Negative 08/23/2023 04:28 PM    POCUROBILIGE 0.2 08/23/2023 04:28 PM    POCPROTEIN Negative 08/23/2023 04:28 PM    POCURPH 5.5 08/23/2023 04:28 PM    POCBLOOD Negative 08/23/2023 04:28 PM    POCSPGRV >=1.030 08/23/2023 04:28 PM    POCKETONES Negative 08/23/2023 04:28 PM    POCBILIRUBIN Negative 08/23/2023 04:28 PM    POCGLUCUA Negative 08/23/2023 04:28 PM       Urine culture in progress  Vag pathogen swab in progress    Assessment/Plan:     Diagnosis and associated orders:     1. Acute right-sided low back pain with right-sided sciatica  - ketorolac (Toradol) injection 15 mg  - predniSONE (DELTASONE) 20 MG Tab; Take 2 tabs (40mg) daily x 3 days, then  take 1 tab (20mg) daily x 3 days, then take 1/2 tab (10mg) daily x 4 days  Dispense: 11 Tablet; Refill: 0    2. Acute cystitis without hematuria  - ciprofloxacin (CIPRO) 500 MG Tab; Take 1 Tablet by mouth every 12 hours for 7 days.  Dispense: 14 Tablet; Refill: 0    3. Flank pain  - POCT Urinalysis  - URINE CULTURE(NEW); Future    4. Vaginal discharge  - VAGINAL PATHOGENS DNA PANEL; Future          Comments/MDM:     Ketorolac IM given in clinic for acute RS back pain. Patient tolerated this well.  Prednisone taper sent to patients preferred pharmacy  Supportive care for back pain discussed with patient in clinic. Alternate heat/ice always ending in ice.  Easy ROM exercises. Avoidance of strict bed rest. No NSAIDs while taking prednisone.   Cipro for acute cystitis as UA positive for leukocytes. Cautioned the patient about the side effects and BLACK BOX WARNINGS of flouroquinolones including: myasthenia gravis exacerbation, peripheral neuropathy, CNS effects, and tendonitis/tendon rupture.  Common reactions include GI upset, headaches, dizziness. Take at least 1 hour before or 2 hours after antacids, or other drug products containing calcium/iron/zinc.  Culture in progress.   Vag path swab in progress         All questions answered. Patient verbalized understanding and is in agreement with this plan of care.     If symptoms are worsening or not improving in 3-5 days, follow-up with PCP or return to UC. Differential diagnosis, natural history, and supportive care discussed. AVS handout given and reviewed with patient. Patient educated on red flags and when to seek treatment back in ED or UC.     I personally reviewed prior external notes and test results pertinent to today's visit.  I have independently reviewed and interpreted all diagnostics ordered during this urgent care visit.     This dictation has been created using voice recognition software. The accuracy of the dictation is limited by the abilities of the  software. I expect there may be some errors of grammar and possibly content. I made every attempt to manually correct the errors within my dictation. However, errors related to voice recognition software may still exist and should be interpreted within the appropriate context.    This note was electronically signed by JESSE Morrison      Yes

## 2023-08-24 DIAGNOSIS — B96.89 BV (BACTERIAL VAGINOSIS): ICD-10-CM

## 2023-08-24 DIAGNOSIS — N76.0 BV (BACTERIAL VAGINOSIS): ICD-10-CM

## 2023-08-24 LAB
CANDIDA DNA VAG QL PROBE+SIG AMP: NEGATIVE
G VAGINALIS DNA VAG QL PROBE+SIG AMP: POSITIVE
T VAGINALIS DNA VAG QL PROBE+SIG AMP: NEGATIVE

## 2023-08-24 RX ORDER — METRONIDAZOLE 7.5 MG/G
1 GEL TOPICAL
Qty: 45 G | Refills: 0 | Status: SHIPPED | OUTPATIENT
Start: 2023-08-24 | End: 2023-08-28 | Stop reason: CLARIF

## 2023-08-25 NOTE — PROGRESS NOTES
Vaginal pathogen panel + BV  Metrogel sent to patient's preferred pharmacy.   Cautioned patient against side effects of metronidazole (flagyl) including: GI upset, metallic taste, dizziness, and the possibility of disulfiram-like reactions if consumed within 72 hours of ethanol.

## 2023-08-26 LAB
BACTERIA UR CULT: ABNORMAL
BACTERIA UR CULT: ABNORMAL
SIGNIFICANT IND 70042: ABNORMAL
SITE SITE: ABNORMAL
SOURCE SOURCE: ABNORMAL

## 2023-08-28 ENCOUNTER — TELEPHONE (OUTPATIENT)
Dept: URGENT CARE | Facility: CLINIC | Age: 48
End: 2023-08-28
Payer: COMMERCIAL

## 2023-08-28 DIAGNOSIS — B96.89 BV (BACTERIAL VAGINOSIS): ICD-10-CM

## 2023-08-28 DIAGNOSIS — N76.0 BV (BACTERIAL VAGINOSIS): ICD-10-CM

## 2023-08-28 DIAGNOSIS — N30.00 ACUTE CYSTITIS WITHOUT HEMATURIA: ICD-10-CM

## 2023-08-28 RX ORDER — METRONIDAZOLE 500 MG/1
500 TABLET ORAL 2 TIMES DAILY
Qty: 14 TABLET | Refills: 0 | Status: SHIPPED | OUTPATIENT
Start: 2023-08-28 | End: 2023-09-04

## 2023-08-28 ASSESSMENT — ENCOUNTER SYMPTOMS
NECK PAIN: 0
CHILLS: 0
NAUSEA: 0
MYALGIAS: 0
ABDOMINAL PAIN: 0
FEVER: 0
DIAPHORESIS: 0
FLANK PAIN: 0
VOMITING: 0
WEIGHT LOSS: 0
FALLS: 0
DIARRHEA: 0

## 2023-08-28 NOTE — TELEPHONE ENCOUNTER
Vag path + BV  Urine culture + Gardnerella   Cautioned patient against side effects of metronidazole (flagyl) including: GI upset, metallic taste, dizziness, and the possibility of disulfiram-like reactions if consumed within 72 hours of ethanol.   All questions answered. Patient verbalized understanding and is in agreement with this plan of care.

## 2023-09-19 ENCOUNTER — APPOINTMENT (OUTPATIENT)
Dept: TELEHEALTH | Facility: TELEMEDICINE | Age: 48
End: 2023-09-19
Payer: COMMERCIAL

## 2024-01-25 ENCOUNTER — OFFICE VISIT (OUTPATIENT)
Dept: URGENT CARE | Facility: PHYSICIAN GROUP | Age: 49
End: 2024-01-25

## 2024-01-25 VITALS
DIASTOLIC BLOOD PRESSURE: 84 MMHG | OXYGEN SATURATION: 98 % | TEMPERATURE: 97.9 F | HEART RATE: 100 BPM | BODY MASS INDEX: 24.07 KG/M2 | HEIGHT: 64 IN | SYSTOLIC BLOOD PRESSURE: 116 MMHG | WEIGHT: 141 LBS | RESPIRATION RATE: 16 BRPM

## 2024-01-25 DIAGNOSIS — J01.90 ACUTE BACTERIAL SINUSITIS: ICD-10-CM

## 2024-01-25 DIAGNOSIS — B96.89 ACUTE BACTERIAL SINUSITIS: ICD-10-CM

## 2024-01-25 PROCEDURE — 3074F SYST BP LT 130 MM HG: CPT | Performed by: REGISTERED NURSE

## 2024-01-25 PROCEDURE — 99213 OFFICE O/P EST LOW 20 MIN: CPT | Performed by: REGISTERED NURSE

## 2024-01-25 PROCEDURE — 3079F DIAST BP 80-89 MM HG: CPT | Performed by: REGISTERED NURSE

## 2024-01-25 RX ORDER — DOXYCYCLINE HYCLATE 100 MG
100 TABLET ORAL 2 TIMES DAILY
Qty: 14 TABLET | Refills: 0 | Status: SHIPPED | OUTPATIENT
Start: 2024-01-25 | End: 2024-02-01

## 2024-01-25 ASSESSMENT — ENCOUNTER SYMPTOMS
SHORTNESS OF BREATH: 0
FEVER: 0
CHILLS: 0
DIZZINESS: 0
ABDOMINAL PAIN: 0

## 2024-01-25 NOTE — LETTER
January 25, 2024         Patient: Mary Ann Gilbert   YOB: 1975   Date of Visit: 1/25/2024           To Whom it May Concern:    Mary Ann Gilbert was seen in my clinic on 1/25/2024.    If you have any questions or concerns, please don't hesitate to call.        Sincerely,           JESSE Lopez  Electronically Signed

## 2024-07-16 NOTE — PROGRESS NOTES
"Assessment/Plan:   Diagnoses and all orders for this visit:    Easy bruising  - 3months post-partum   - nml labs   - has been taking post-natals - could be 2/2 MVI   - cont to monitor   - f/u PRN - pt aware and agreeable           Subjective:    Patient ID: Lizeth Birch is a 24 y.o. female.  HPI  25yo F presents to the office for f/u   - pt concerned that she has noted increased bruising on her b/l LE   - of note, is 3months post-partum   - reviewed nml labs done 7/3/2024     The following portions of the patient's history were reviewed and updated as appropriate: allergies, current medications, past family history, past medical history, past social history, past surgical history and problem list.    Review of Systems  as per HPI    Objective:  /74   Pulse 94   Ht 5' 2\" (1.575 m)   Wt 78.5 kg (173 lb)   SpO2 97%   BMI 31.64 kg/m²    Physical Exam  Vitals reviewed.   Constitutional:       General: She is not in acute distress.     Appearance: Normal appearance. She is not ill-appearing, toxic-appearing or diaphoretic.   HENT:      Head: Normocephalic and atraumatic.      Right Ear: External ear normal.      Left Ear: External ear normal.      Nose: Nose normal.   Eyes:      General: No scleral icterus.        Right eye: No discharge.         Left eye: No discharge.      Extraocular Movements: Extraocular movements intact.      Conjunctiva/sclera: Conjunctivae normal.   Pulmonary:      Effort: Pulmonary effort is normal.   Musculoskeletal:         General: Normal range of motion.      Right lower leg: No edema.      Left lower leg: No edema.   Skin:     General: Skin is warm.      Findings: Bruising present.   Neurological:      General: No focal deficit present.      Mental Status: She is alert and oriented to person, place, and time.   Psychiatric:         Mood and Affect: Mood normal.         Behavior: Behavior normal.         " "Subjective:   Mary Ann Gilbert is a 48 y.o. female who presents for Other (sinus infection, right ear pain, eye drainage, sinus pressure and thick drainage/X 7 days)      HPI  Had cold symptoms about 7-10 days ago. Was treating with OTC medications but no relief. Over the past few days has had worsening sinus pressure and congestion, with thick drainage. No recent hx of sinus or ear infections. No heart or lung issues. Tolerating PO. Denies high fever over 102, eye pain, acute vision changes, neck stiffness, equilibrium disturbances, unilateral/bilateral weakness    Review of Systems   Constitutional:  Negative for chills and fever.   Respiratory:  Negative for shortness of breath.    Cardiovascular:  Negative for chest pain.   Gastrointestinal:  Negative for abdominal pain.   Skin:  Negative for rash.   Neurological:  Negative for dizziness.     Medications, Allergies, and current problem list reviewed today in Epic.     Objective:     /84   Pulse 100   Temp 36.6 °C (97.9 °F) (Temporal)   Resp 16   Ht 1.626 m (5' 4\")   Wt 64 kg (141 lb)   SpO2 98%     Physical Exam  Vitals and nursing note reviewed.   Constitutional:       General: She is not in acute distress.     Appearance: Normal appearance. She is well-developed. She is not ill-appearing, toxic-appearing or diaphoretic.   HENT:      Head: Normocephalic and atraumatic.      Right Ear: Hearing, tympanic membrane, ear canal and external ear normal. No decreased hearing noted. Tympanic membrane is not erythematous.      Left Ear: Hearing, tympanic membrane, ear canal and external ear normal. No decreased hearing noted. Tympanic membrane is not erythematous.      Nose: Mucosal edema, congestion and rhinorrhea present. Rhinorrhea is purulent.      Right Turbinates: Swollen.      Left Turbinates: Swollen.      Right Sinus: Maxillary sinus tenderness present.      Left Sinus: Maxillary sinus tenderness present.      Mouth/Throat:      Mouth: Mucous " membranes are moist.      Dentition: Normal dentition.      Pharynx: Posterior oropharyngeal erythema present. No oropharyngeal exudate.   Eyes:      General: No scleral icterus.        Right eye: No discharge.         Left eye: No discharge.      Conjunctiva/sclera: Conjunctivae normal.   Cardiovascular:      Rate and Rhythm: Normal rate and regular rhythm.      Pulses: Normal pulses.      Heart sounds: Normal heart sounds. No murmur heard.  Pulmonary:      Effort: Pulmonary effort is normal. No respiratory distress.      Breath sounds: Normal breath sounds. No wheezing, rhonchi or rales.   Musculoskeletal:      Cervical back: Normal range of motion and neck supple.   Lymphadenopathy:      Cervical: No cervical adenopathy.   Skin:     General: Skin is warm and dry.      Nails: There is no clubbing.   Neurological:      General: No focal deficit present.      Mental Status: She is alert and oriented to person, place, and time. Mental status is at baseline.   Psychiatric:         Mood and Affect: Mood normal.         Assessment/Plan:       1. Acute bacterial sinusitis  doxycycline (VIBRAMYCIN) 100 MG Tab        Cold symptoms over a week ago was improving but now no relief from OTC medications with worsening sinus pressure congestion and drainage.  No history of sinus surgeries.  No red flag signs or symptoms.  Vitals reassuring.  Exam consistent with bacterial sinusitis.  Will start on doxycycline given allergies.  Continue with OTC cold and sinus medication.  Warm steam showers.  Adequate hydration.  Discussed return precautions    Differential diagnosis, natural history, and supportive care discussed.     Advised the patient to follow-up with the primary care physician for recheck, reevaluation, and consideration of further management.    Indications for ED discussed at length. Red flag symptoms discussed. All side effects of medication discussed including allergic response, GI upset, tendon injury, rash, sedation  etc. Patient and/or guardian voices understanding.     I personally reviewed prior external notes and test results pertinent to today's visit as well as additional imaging and testing completed in clinic today.     Please note that this dictation was created using voice recognition software. I have made every reasonable attempt to correct obvious errors, but I expect that there are errors of grammar and possibly content that I did not discover before finalizing the note.    This note was electronically signed by JESSE Lopez